# Patient Record
Sex: MALE | Race: WHITE | NOT HISPANIC OR LATINO | Employment: FULL TIME | URBAN - METROPOLITAN AREA
[De-identification: names, ages, dates, MRNs, and addresses within clinical notes are randomized per-mention and may not be internally consistent; named-entity substitution may affect disease eponyms.]

---

## 2017-10-24 ENCOUNTER — AMBULATORY - HEALTHEAST (OUTPATIENT)
Dept: NURSING | Facility: CLINIC | Age: 42
End: 2017-10-24

## 2017-10-24 DIAGNOSIS — Z23 IMMUNIZATION DUE: ICD-10-CM

## 2017-12-05 ENCOUNTER — HOSPITAL ENCOUNTER (OUTPATIENT)
Dept: PHYSICAL MEDICINE AND REHAB | Facility: CLINIC | Age: 42
Discharge: HOME | End: 2017-12-05
Attending: PHYSICIAN ASSISTANT

## 2017-12-05 DIAGNOSIS — M79.18 MYOFASCIAL PAIN: ICD-10-CM

## 2017-12-05 DIAGNOSIS — M54.50 ACUTE LOW BACK PAIN: ICD-10-CM

## 2017-12-12 ENCOUNTER — OFFICE VISIT - HEALTHEAST (OUTPATIENT)
Dept: PHYSICAL THERAPY | Facility: REHABILITATION | Age: 42
End: 2017-12-12

## 2017-12-12 DIAGNOSIS — R29.3 POOR POSTURE: ICD-10-CM

## 2017-12-12 DIAGNOSIS — G89.29 CHRONIC BILATERAL LOW BACK PAIN WITHOUT SCIATICA: ICD-10-CM

## 2017-12-12 DIAGNOSIS — M54.50 CHRONIC BILATERAL LOW BACK PAIN WITHOUT SCIATICA: ICD-10-CM

## 2017-12-12 DIAGNOSIS — M62.81 GENERALIZED MUSCLE WEAKNESS: ICD-10-CM

## 2017-12-21 ENCOUNTER — OFFICE VISIT - HEALTHEAST (OUTPATIENT)
Dept: PHYSICAL THERAPY | Facility: REHABILITATION | Age: 42
End: 2017-12-21

## 2017-12-21 DIAGNOSIS — R29.3 POOR POSTURE: ICD-10-CM

## 2017-12-21 DIAGNOSIS — M54.50 CHRONIC BILATERAL LOW BACK PAIN WITHOUT SCIATICA: ICD-10-CM

## 2017-12-21 DIAGNOSIS — G89.29 CHRONIC BILATERAL LOW BACK PAIN WITHOUT SCIATICA: ICD-10-CM

## 2017-12-21 DIAGNOSIS — M62.81 GENERALIZED MUSCLE WEAKNESS: ICD-10-CM

## 2018-01-04 ENCOUNTER — OFFICE VISIT - HEALTHEAST (OUTPATIENT)
Dept: PHYSICAL THERAPY | Facility: REHABILITATION | Age: 43
End: 2018-01-04

## 2018-01-04 DIAGNOSIS — G89.29 CHRONIC BILATERAL LOW BACK PAIN WITHOUT SCIATICA: ICD-10-CM

## 2018-01-04 DIAGNOSIS — M54.50 CHRONIC BILATERAL LOW BACK PAIN WITHOUT SCIATICA: ICD-10-CM

## 2018-01-04 DIAGNOSIS — R29.3 POOR POSTURE: ICD-10-CM

## 2018-01-04 DIAGNOSIS — M62.81 GENERALIZED MUSCLE WEAKNESS: ICD-10-CM

## 2018-01-08 ENCOUNTER — HOSPITAL ENCOUNTER (OUTPATIENT)
Dept: PHYSICAL MEDICINE AND REHAB | Facility: CLINIC | Age: 43
Discharge: HOME | End: 2018-01-08
Attending: PHYSICIAN ASSISTANT

## 2018-01-08 DIAGNOSIS — M79.18 MYOFASCIAL PAIN: ICD-10-CM

## 2018-01-08 DIAGNOSIS — M54.50 ACUTE LOW BACK PAIN: ICD-10-CM

## 2018-01-18 ENCOUNTER — OFFICE VISIT - HEALTHEAST (OUTPATIENT)
Dept: PHYSICAL THERAPY | Facility: REHABILITATION | Age: 43
End: 2018-01-18

## 2018-01-18 DIAGNOSIS — M62.81 GENERALIZED MUSCLE WEAKNESS: ICD-10-CM

## 2018-01-18 DIAGNOSIS — G89.29 CHRONIC BILATERAL LOW BACK PAIN WITHOUT SCIATICA: ICD-10-CM

## 2018-01-18 DIAGNOSIS — M54.50 CHRONIC BILATERAL LOW BACK PAIN WITHOUT SCIATICA: ICD-10-CM

## 2018-01-18 DIAGNOSIS — R29.3 POOR POSTURE: ICD-10-CM

## 2018-05-08 ENCOUNTER — OFFICE VISIT - HEALTHEAST (OUTPATIENT)
Dept: FAMILY MEDICINE | Facility: CLINIC | Age: 43
End: 2018-05-08

## 2018-05-08 DIAGNOSIS — G89.29 CHRONIC BILATERAL LOW BACK PAIN WITHOUT SCIATICA: ICD-10-CM

## 2018-05-08 DIAGNOSIS — Z00.00 ROUTINE GENERAL MEDICAL EXAMINATION AT A HEALTH CARE FACILITY: ICD-10-CM

## 2018-05-08 DIAGNOSIS — M54.6 PAIN IN THORACIC SPINE: ICD-10-CM

## 2018-05-08 DIAGNOSIS — M54.50 CHRONIC BILATERAL LOW BACK PAIN WITHOUT SCIATICA: ICD-10-CM

## 2018-05-08 ASSESSMENT — MIFFLIN-ST. JEOR: SCORE: 1773.24

## 2018-05-11 ENCOUNTER — AMBULATORY - HEALTHEAST (OUTPATIENT)
Dept: LAB | Facility: CLINIC | Age: 43
End: 2018-05-11

## 2018-05-11 ENCOUNTER — COMMUNICATION - HEALTHEAST (OUTPATIENT)
Dept: FAMILY MEDICINE | Facility: CLINIC | Age: 43
End: 2018-05-11

## 2018-05-11 DIAGNOSIS — Z00.00 ROUTINE GENERAL MEDICAL EXAMINATION AT A HEALTH CARE FACILITY: ICD-10-CM

## 2018-05-11 LAB
CHOLEST SERPL-MCNC: 216 MG/DL
FASTING STATUS PATIENT QL REPORTED: YES
FASTING STATUS PATIENT QL REPORTED: YES
GLUCOSE BLD-MCNC: 90 MG/DL (ref 70–99)
HDLC SERPL-MCNC: 63 MG/DL
LDLC SERPL CALC-MCNC: 138 MG/DL
TRIGL SERPL-MCNC: 75 MG/DL

## 2018-08-02 ENCOUNTER — RECORDS - HEALTHEAST (OUTPATIENT)
Dept: ADMINISTRATIVE | Facility: OTHER | Age: 43
End: 2018-08-02

## 2021-05-31 VITALS — WEIGHT: 192 LBS

## 2021-05-31 VITALS — WEIGHT: 191 LBS

## 2021-06-01 VITALS — WEIGHT: 189 LBS | BODY MASS INDEX: 25.05 KG/M2 | HEIGHT: 73 IN

## 2021-06-14 NOTE — PROGRESS NOTES
"[facility]   [facility] Initial Evaluation    Patient Name: Mauricio Simon  Date of evaluation: 12/12/2017  Referral Diagnosis:   Acute low back pain [M54.5]  - Primary       Myofascial pain [M79.1]       Referring provider: Lamar Tirado PA-C  Visit Diagnosis:     ICD-10-CM    1. Chronic bilateral low back pain without sciatica M54.5     G89.29    2. Generalized muscle weakness M62.81    3. Poor posture R29.3        Assessment:   Mauricio Simon (Nick) is a 42 y.o. male who presents to therapy today with chief complaints of midline lower back pain without radiation. Pt has experienced back pain for >2 years with recent increase in pain approximately 2 weeks ago after bending over.   Current pain is located across midline R > L sided, described as very \"tight\". No LE radiation, no numbness/tingling.  Patient is unable to sleep without waking due to pain, and has difficulty sitting for long periods and bending forward due to pain.   Evaluation reveals: restrictions in lumbar AROM, + hypomobility of facet joint, + mm hypertonicity. S/s consistent with back pain likely due to disc pathology vs facet joint restriction. Patient will benefit from 1:1 skilled physical therapy services to address the above limitations.       Goals:  Pt. will be independent with home exercise program in : 2 weeks  Pt will: squat and lift >30 lbs without increased pain to improve ADL's in 8 weeks  Pt will: sit for >60 min without increased back pain to better tolerate desk job in 8 weeks  Pt will: return to walking at a brisk pace for >30 min without increased pain to progress towards running in 8 weeks  Pt will: sleep through the night without waking due to back pain >3/7 nights/week in 8 weeks    Patient's expectations/goals are realistic.    Barriers to Learning or Achieving Goals:  No Barriers.       Plan / Patient Instructions:        Plan of Care:   Authorization / Certification Start Date: 12/12/17  Authorization / " "Certification Number of Visits: up to 8 visits   Communication with: Referral Source  Patient Related Instruction: Nature of Condition;Self Care instruction;Treatment plan and rationale;Basis of treatment;Body mechanics;Posture;Next steps  Times per Week: 1x/week  Number of Weeks: up to 12 weeks  Number of Visits: up to 8 visits   Therapeutic Exercise: ROM;Stretching;Strengthening  Neuromuscular Reeducation: kinesio tape;posture;balance/proprioception;TNE;core  Manual Therapy: myofascial release;soft tissue mobilization;joint mobilization;muscle energy  Other Plan #1: therapeutic activity     Plan for next visit: progress strengthening      Subjective:        Mauricio (Jennifer Simon is a 43 y/o male who presents today with chief c/o midline lower back pain without radiation. Pt has experienced back pain for >2 years with recent increase in pain approximately 2 weeks ago.   Pt reports he was bending over after getting out of the shower and felt sharp R>L sided pain. His pain lessened slightly, but a week later increased again after sleeping.   [facility] which he is finishing today. His pain has significantly reduced since using the steroids. Current pain is located across midline, described as very \"tight\". No LE radiation, no numbness/tingling. Works as a .       Patient goals (established in collaboration with pt today): return to exercise walking and running, return to yardwork/housework without pain       Current activity level: enjoys walking, hiking, hasn't run in 2-3 months and would like to return this       Social information:   Living Situation:single family home   Occupation: [employer]      Pain Rating:3  Pain rating at best: 0  Pain rating at worst: 10  Pain description: aching and sharp    Patient reports benefit from:  ibuprofen         Objective:      Note: Items left blank indicates the item was not performed or not indicated at the time " of the evaluation.    Patient Outcome Measures :    Modified Oswestry Low Back Pain Disablity Questionnaire  in %: 14   Scores range from 0-100%, where a score of 0% represents minimal pain and maximal function. The minimal clinically important difference is a score reduction of 12%.    Examination  1. Chronic bilateral low back pain without sciatica     2. Generalized muscle weakness     3. Poor posture         Precautions/Restrictions: None  Involved side: Bilateral    Posture Observation:   Standing: stands with decreased Lx lordosis, decreased glut tone noted, increased thoracic kyphosis   Sitting:        Lumbar ROM:    Date:      *Indicate scale AROM AROM AROM   Lumbar Flexion Finger tips past patella B no symptoms      Lumbar Extension Full no pain       Right Left Right Left Right Left   Lumbar Sidebending No pain, full ROM No pain, full ROM        Lumbar Rotation No pain, full ROM No pain, full ROM        Thoracic Flexion      Thoracic Extension      Thoracic Sidebending         Thoracic Rotation           Lower Extremity Strength:     Date:      LE strength/5 Right Left Right Left Right Left   Hip Flexion (L1-3) 5 5       Hip Extension (L5-S1)         Hip Abduction (L4-5)         Hip Adduction (L2-3)         Hip External Rotation         Hip Internal Rotation         Knee Extension (L3-4)         Knee Flexion         Ankle Dorsiflexion (L4-5) 5 5       Great Toe Extension (L5) 5 5       Ankle Plantar flexion (S1) 5 5       Abdominals                       Reflex Testing:  Patellar (L3-4): WNL B  Achilles (S1-2): WNL B    Sensation:      Hip PROM with overpressure:   Flexion/IR/ER WNLB no pain with OP  +SURY with tightness only  SLR limited by hamstrings at approx 60 degrees, -ve NNT      Lumbar Special Tests:    Lumbar Special Tests Right Left SI Tests Right  Left   Quadrant test   SI Compression     Straight leg raise   SI Distraction     Crossover response   Thigh Thrust     Slump   Sacral Thrust        SURY     Trunk extensor endurance test  Resisted Abduction     Prone instability test  Other:         Palpation:   Hypertonic R > L sided paraspinals     Repeated Motion Testing:      Passive Mobility - Joint Integrity:  PA's: hypomobility L5>L4>L3 R > L sided pain       Treatment Today     TREATMENT MINUTES COMMENTS   Evaluation 30 Lumbar evaluation    Self-care/ Home management     Manual therapy     Neuromuscular Re-education     Therapeutic Activity     Therapeutic Exercises 25 Educated on disc pathology including micro-tearing of annulus, inflammatory process, facet joint restriction, and mm resulting hypertonicity.  Discussed benefits of continued walking for exercise. Suggested pt can walk as far as he would like as long as he does not increase Sx.    Exercises initiated:   Exercise #1: TrA  Comment #1: HEP x 10 second hold x 15-20 reps  Exercise #2: Multifidus in prone   Comment #2: HEP x 10 sec hold x 15-20 reps  Exercise #3: Bridges  Comment #3: HEP x 5 seconds x 10-15 reps  Exercise #4: Stretching: supine figure 4 piriformis and seated hamstring with arm overhead  Comment #4: HEP x 30-45 seconds x 2-3 reps     Gait training     Modality__________________                Total 55    Blank areas are intentional and mean the treatment did not include these items.       PT Evaluation Code: (Please list factors)  Patient History/Comorbidities: back pain  Examination: see above  Clinical Presentation: stable  Clinical Decision Making: low    Patient History/  Comorbidities Examination  (body structures and functions, activity limitations, and/or participation restrictions) Clinical Presentation Clinical Decision Making (Complexity)   No documented Comorbidities or personal factors 1-2 Elements Stable and/or uncomplicated Low   1-2 documented comorbidities or personal factor 3 Elements Evolving clinical presentation with changing characteristics Moderate   3-4 documented comorbidities or personal factors 4 or  more Unstable and unpredictable High              Tova Brewster  12/12/2017  3:33 PM

## 2021-06-14 NOTE — PROGRESS NOTES
ASSESSMENT: Mauricio Simon is a 42 y.o. male who is otherwise who presents today for new patient evaluation of a one-week history of midline lower back pain with radiation across the right PSIS.  Patient has not had any imaging of the spine.  I suspect that the patient has an annular tear/bulge with secondary myofascial pain.  He is neurologically intact.  He does not have any radicular symptoms.  No red flags.    NICKO: 30  Who 5: 19    PLAN:  A shared decision making model was used.  The patient's values and choices were respected.  The following represents what was discussed and decided upon by the physician assistant and the patient.      1.  DIAGNOSTIC TESTS: No diagnostic tests were ordered as the patient not have any radicular symptoms/red flags and it would not change our treatment plan at this time.  If the patient's pain were to worsen or not improve, recommend an MRI of the lumbar spine.    2.  PHYSICAL THERAPY:  Discussed the importance of core strengthening, ROM, stretching exercises with the patient and how each of these entities is important in decreasing pain.  Explained to the patient that the purpose of physical therapy is to teach the patient a home exercise program.  These exercises need to be performed every day in order to decrease pain and prevent future occurrences of pain.  I placed an order for the patient to begin physical therapy at the [place] location [facility].  He may benefit from a transition to the medics program.    3.  MEDICATIONS:   -Prescribed a Medrol Dosepak for the patient.  Asked that he stop using ibuprofen while he is taking the Medrol Dosepak.  After he completes the Medrol Dosepak he may resume ibuprofen.  -Also provided a prescription for cyclobenzaprine 5-10 mg at bedtime as needed.    4.  INTERVENTIONS: No interventions were ordered.  The patient could potentially benefit from interventional pain management if he fails to improve with conservative treatment,  "depending on the results of advanced imaging studies.    5.  PATIENT EDUCATION: The patient is in agreement with the above plan.  All questions were answered.    6.  FOLLOW-UP:   I will see the patient back in clinic in about 3 weeks.  If he has any questions or concerns in the meantime, he should not hesitate to contact our clinic.      SUBJECTIVE:  Mauricio Simon  Is a 42 y.o. male who presents today for evaluation of low back pain.  The patient was last seen in our clinic in November 2014 by Dr. Rao.  At that time he was complaining of chronic low back pain as well as intermittent sharp thoracic pain.  Patient participated in physical therapy which did give him some relief.  The patient states that he was doing relatively well with only mild low back pain until 1 week ago.  The patient states that he stepped out of the shower and bent over to drive his legs when he felt a sudden, severe pain in the right side of his lower back.  He states it felt like his right back \"gave out.\"  The patient started using ibuprofen and felt slightly better over the next several days until 3 days ago when he woke up with severe pain again.  He spent almost the entire day on the couch.    The patient complains of midline lower back pain.  The pain then radiates across the right side of the lower back.  The patient describes the spine pain as feeling like a deep, bone pain.  On the right side he states it feels like an aching muscle, or a bruised sensation.  He denies any pain radiating into the buttocks or down the legs.  Patient's pain is aggravated with prolonged sitting, transitioning from seated to standing, twisting, and bending.  He has been having difficulty sleeping because of the pain.  He denies any alleviating factors for the pain.  He tried applying heat which did not help.  Patient denies any numbness, tingling, or weakness on the legs.  He denies any loss of bowel or bladder control.  He denies any recent fevers, " chills, sweats.    As mentioned above, the patient did physical therapy for his lower back in 2000 14/2015.  He has not been doing home exercise program.  He does not go to the chiropractor.  He has never had any spine surgery or spine injections.  The patient's been taking ibuprofen 600 mg twice daily which has been helpful.    Current medications: Ibuprofen as needed    No Known Allergies    Past medical history: None    Past Surgical History:   Procedure Laterality Date     NC ABDOMEN SURGERY PROC UNLISTED      Description: Hernia Repair;  Recorded: 09/30/2014;     [place] REMOVAL OF SPERM DUCT(S)      Description: Surgery Of Male Genitalia Vasectomy;  Recorded: 08/20/2010;  Comments: 8/20/2010     Family history: Aunt and grandmother had colon cancer, grandfather had a stroke    Social history: The patient is .  He has 2 children age 14 and 13.  He works as a .  He denies tobacco, alcohol, or illicit drug use.    ROS: Positive for poor sleep quality, back pain.  Specifically negative for bowel/bladder dysfunction, fevers,chills, appetite changes, unexplained weight loss.   Otherwise 13 systems reviewed are negative.  Please see the patient's intake questionnaire from today for details.      OBJECTIVE:  PHYSICAL EXAMINATION:    CONSTITUTIONAL:  Vital signs as above.  No acute distress.  The patient is well nourished and well groomed.  PSYCHIATRIC:  The patient is awake, alert, oriented to person, place, time and answering questions appropriately with clear speech.    HEENT: Normocephalic, atraumatic.  Sclera clear.  Neck is supple.  SKIN:  Skin over the face, bilateral lower extremities, and posterior torso is clean, dry, intact without rashes.    GAIT:  Gait is non-antalgic.  The patient is able to heel and toe walk without significant difficulty.    STANDING EXAMINATION: Tender to palpation over the spinous processes at approximately L4/L5.  Mild tenderness palpation along the right PSIS.  No  tenderness palpation over the sacroiliac joints.  Lumbar flexion severely restricted due to pain.  Lumbar extension intact.    MUSCLE STRENGTH:  The patient has 5/5 strength for the bilateral hip flexors, knee flexors/extensors, ankle dorsiflexors/plantar flexors, great toe extensors, ankle evertors/invertors.  NEUROLOGICAL:  2/4 patellar, and achilles reflexes bilaterally.  Negative Babinski's bilaterally.  No ankle clonus bilaterally. Sensation to light touch is intact in the bilateral L4, L5, and S1 dermatomes.  VASCULAR:  2/4 dorsalis pedis pulses bilaterally.  Bilateral lower extremities are warm.  There is no pitting edema of the bilateral lower extremities.  ABDOMINAL:  Soft, non-distended, non-tender throughout all quadrants.  No pulsatile mass palpated in the left lower quadrant.  LYMPH NODES:  No palpable or tender inguinal lymph nodes.  MUSCULOSKELETAL: Straight leg raise on the right reproduces low back pain, leg pain.  Straight leg raise on the left mildly reduced his low back pain, but not leg pain.

## 2021-06-14 NOTE — PROGRESS NOTES
"[facility] Daily Progress     Patient Name: Mauricio Simon  Date: 12/21/2017  Visit #: 2  PTA visit #:  0  Referral Diagnosis:   Acute low back pain [M54.5]  - Primary       Myofascial pain [M79.1]       Referring provider: Lamar Tirado PA-C  Visit Diagnosis:     ICD-10-CM    1. Chronic bilateral low back pain without sciatica M54.5     G89.29    2. Generalized muscle weakness M62.81    3. Poor posture R29.3      Mauricio Simon (Nick) is a 42 y.o. male who presents to therapy today with chief complaints of midline lower back pain without radiation. Pt has experienced back pain for >2 years with recent increase in pain approximately 2 weeks ago after bending over.   Current pain is located across midline R > L sided, described as very \"tight\". No LE radiation, no numbness/tingling.  Patient is unable to sleep without waking due to pain, and has difficulty sitting for long periods and bending forward due to pain.   Evaluation reveals: restrictions in lumbar AROM, + hypomobility of facet joint, + mm hypertonicity. S/s consistent with back pain likely due to disc pathology vs facet joint restriction. Patient will benefit from 1:1 skilled physical therapy services to address the above limitations.     Assessment:     HEP/POC compliance is  good .     The patient reports good improvements in pain complaints. His HEP was reviewed today and he shows good understanding and compliance with this. He was able to progress strengthening exercises well today and would benefit from continuing to progress HEP with more difficult core strengthening exercises.     Goal Status:  Pt. will be independent with home exercise program in : 2 weeks  Pt will: squat and lift >30 lbs without increased pain to improve ADL's in 8 weeks  Pt will: sit for >60 min without increased back pain to better tolerate desk job in 8 weeks  Pt will: return to walking at a brisk pace for >30 min without increased pain to progress towards " running in 8 weeks  Pt will: sleep through the night without waking due to back pain >3/7 nights/week in 8 weeks    Plan / Patient Education:     Continue with initial plan of care.     Progress core strengthening (prone supermans, planks/sideplanks, quadruped).     Subjective:     Pain Rating: Low level pain.    Overall feeling pretty good. Thinks that the exercises are helping. His original pain feels really good. His normal low back pain also feels better!     Objective:     Progressed Bridge to SL Bridge and TA sets to up up/down down.  No cueing required for TA sets.  Some difficulty with prone multifidi engagement- addition of leg lift to HEP.  Educated on proper sitting posture- avoiding prolonged positioning in PPT/sacral sitting    Exercises:  Exercise #1: TrA  Comment #1: HEP x 10 second hold x 15-20 reps  Exercise #2: Multifidus in prone   Comment #2: HEP x 10 sec hold x 15-20 reps  Exercise #3: Bridges- Progressed to SL bridge  Comment #3: HEP x 3 seconds x 10-15 reps  Exercise #4: Stretching: supine figure 4 piriformis and seated hamstring with arm overhead  Comment #4: HEP x 30-45 seconds x 2-3 reps  Exercise #5: TA Up Up Down Down  Comment #5: X 10   Exercise #6: Prone Leg Extensions  Comment #6: X 10   Exercise #7: Antirotaiton Bands  Comment #7: X 10 blue band- kneeling      Treatment Today     TREATMENT MINUTES COMMENTS   Evaluation     Self-care/ Home management     Manual therapy     Neuromuscular Re-education     Therapeutic Activity     Therapeutic Exercises 28 Review and progression of HEP, discussion of posture.   Gait training     Modality__________________                Total 28    Blank areas are intentional and mean the treatment did not include these items.       Dez SEO  12/21/2017

## 2021-06-15 NOTE — PROGRESS NOTES
[facility] Discharge Summary  Patient Name: Mauricio Simon  Date: 2/9/2018  Referral Diagnosis: Acute low back pain   Referring provider: Heath Thompson*  Visit Diagnosis:   1. Chronic bilateral low back pain without sciatica     2. Generalized muscle weakness     3. Poor posture         Goals: MET  Pt. 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  The patient met goals and has demonstrated understanding of and independence in the home program for self-care, and progression to next steps.  He will initiate contact if questions or concerns arise.    Therapy will be discontinued at this time.  The patient will need a new referral to resume.    Thank you for your referral.  Tova Brewster  2/9/2018  9:17 AM  [facility] Daily Progress     Patient Name: Mauricio Simon  Date: 1/18/2018  Visit #: 4  PTA visit #:  0  Referral Diagnosis:   Acute low back pain [M54.5]  - Primary       Myofascial pain [M79.1]       Referring provider: Lamar Tirado PA-C  Visit Diagnosis:     ICD-10-CM    1. Chronic bilateral low back pain without sciatica M54.5     G89.29    2. Generalized muscle weakness M62.81    3. Poor posture R29.3      Mauricio Simon (Nick) is a 42 y.o. male who presents to therapy today with chief complaints of midline lower back pain without radiation. Pt has experienced back pain for >2 years with recent increase in pain approximately 2 weeks ago after bending  "over.   Current pain is located across midline R > L sided, described as very \"tight\". No LE radiation, no numbness/tingling.  Patient is unable to sleep without waking due to pain, and has difficulty sitting for long periods and bending forward due to pain.   Evaluation reveals: restrictions in lumbar AROM, + hypomobility of facet joint, + mm hypertonicity. S/s consistent with back pain likely due to disc pathology vs facet joint restriction. Patient will benefit from 1:1 skilled physical therapy services to address the above limitations.     Assessment:     Patient has been seen for 4 PT visits since evaluation. He reports complete resolution of back pain and has met all goals. Lumbar AROM is full and pain free. HEP was advanced with more difficult core and hip strengthening as pt c/o slight lower back \"fatigue\" pain at the end of his work day. He is appropriate to d/c from PT at this time.     Goal Status:  Pt. will be independent with home exercise program in : 2 weeks;Met  Pt will: squat and lift >30 lbs without increased pain to improve ADL's in 8 weeks (met)  Pt will: sit for >60 min without increased back pain to better tolerate desk job in 8 weeks (met)  Pt will: return to walking at a brisk pace for >30 min without increased pain to progress towards running in 8 weeks (met)  Pt will: sleep through the night without waking due to back pain >3/7 nights/week in 8 weeks (met)    Plan / Patient Education:     Hold chart open 30 days before formal dc.    Subjective:     Pain Ratin   \"I think it's been better\". Patient reports minimal pain since last visit; only low level pain with sitting for long periods.     Objective:     Lumbar AROM full and pain free in all planes    Treatment Today     TREATMENT MINUTES COMMENTS   Evaluation     Self-care/ Home management     Manual therapy     Neuromuscular Re-education     Therapeutic Activity     Therapeutic Exercises 29 Reviewed and progressed " HEP:  Exercises:  Exercise #1: Plank and side plank  Comment #1: HEP x 20 seconds 3-5 reps, x 10 seconds x 3-5 reps   Exercise #2: Curl up and oblique curl up   Comment #2: HEP x 3 seconds x 10-15 reps   Exercise #3: Bridges- Progressed to SL bridge  Comment #3: HEP x 5-10 seconds x 10-15 reps in a row  Exercise #4: Stretching: supine figure 4 piriformis and seated hamstring with arm overhead  Comment #4: HEP x 30-45 seconds x 2-3 reps  Exercise #5: TrA with marching  Comment #5: HEP x 10 each leg (20 total)  Exercise #6: Quadruped UE/LE extensions, alternating  Comment #6: HEP x 10-20 reps  Exercise #7: Antirotaiton Bands  Comment #7: X 10 blue band- kneeling  Exercise #8: Quadruped fire hydrants  Comment #8: HEP blue band     Discussed POC and progress. Educated on importance of combination of aerobic & strengthening for continued exercise.     Treadmill walking 3.2 mph x 4:30   Gait training     Modality__________________                Total 29    Blank areas are intentional and mean the treatment did not include these items.       Tova Brewster  1/18/2018

## 2021-06-15 NOTE — PROGRESS NOTES
Assessment:   Mauricio Simon is a 42 y.o. y.o. male who is otherwise healthy who presents today for follow-up regarding an acute flareup of midline low back pain with radiation across the right PSIS.  The patient has had 100% resolution of his acute flareup of pain with physical therapy and medical management (a Medrol Dosepak and muscle relaxants).  The patient has mild chronic lower back pain/tightness which he also feels is improving with physical therapy.  The patient does not have any radicular symptoms.  He is neurologically intact.       Plan:     A shared decision making plan was used.  The patient's values and choices were respected.  The following represents what was discussed and decided upon by the physician assistant and the patient.      1.  DIAGNOSTIC TESTS: No diagnostic tests were ordered.    2.  PHYSICAL THERAPY: This patient is currently in physical therapy.  Encouraged him to continue doing his home exercises.    3.  MEDICATIONS: No changes are made to patient's medications.  He is currently just using ibuprofen as needed for pain.  He completed a Medrol Dosepak which provided significant relief of his pain.  He just ran out of the cyclobenzaprine last night but does not feel that he needs a refill.    4.  INTERVENTIONS: No interventions were ordered.      5.  PATIENT EDUCATION: The patient is in agreement with the above plan.  All questions were answered.    6.  FOLLOW-UP: The patient to follow-up with me as needed.  If he has any questions or concerns, he should not hesitate to call.    Subjective:     Mauricio Simon is a 42 y.o. male who presents today for follow-up regarding an acute flareup of low back pain.  I saw the patient on December 5, 2017.  At that time he reported a one-week history of severe low back pain radiating across the right side of his lower back along the PSIS.  I prescribed a Medrol Dosepak and cyclobenzaprine.  Also refer the patient to physical therapy.  Patient  states that the Medrol Dosepak provided significant relief of his pain.  He reports that his flareup of pain has now resolved.  He feels that he is back to his baseline chronic low back pain/tightness.    The patient states that he has mild residual intermittent low back pain.  This is centralized but spans across low back in a broadband distribution at the belt line.  He is no longer having the severe pain along the right PSIS.  He rates his pain today is a 1 out of 10.  At its best it is a 0-10.  At its worst it is a 3 out of 10.  He denies any pain radiating down the leg.  He denies any numbness, tingling, or weakness down the leg.    The patient has had 3 sessions of physical therapy.  He is doing his home exercises.  He is using ibuprofen as needed for pain.  He took his last cyclobenzaprine last night.  He does not feel he needs a refill.    Past medical history is reviewed and is unchanged in the interim.    Family history is reviewed and is unchanged in the interim.    Review of Systems:  Negative for numbness/tingling, loss of bowel/bladder control, footdrop, weakness, headache, dizziness, nausea/vomiting, blurry vision, balance changes.     Objective:   CONSTITUTIONAL:  Vital signs as above.  No acute distress.  The patient is well nourished and well groomed.    PSYCHIATRIC:  The patient is awake, alert, oriented to person, place and time.  The patient is answering questions appropriately with clear speech.  Normal affect.  HEENT: Normocephalic, atraumatic.  Sclera clear.    SKIN:  Skin over the face, posterior torso, bilateral upper and lower extremities is clean, dry, intact without rashes.  MUSCULOSKELETAL:  Gait is non-antalgic.  No significant tenderness over the bilateral lower lumbar paraspinal muscles.      The patient has 5/5 strength for the bilateral hip flexors, knee flexors/extensors, ankle dorsiflexors/plantar flexors, ankle evertors/invertors.    NEUROLOGICAL:    Sensation to light touch is  intact in the bilateral L4, L5, and S1 dermatomes.

## 2021-06-15 NOTE — PROGRESS NOTES
"[facility] Daily Progress     Patient Name: Mauricio Simon  Date: 1/4/2018  Visit #: 3  PTA visit #:  0  Referral Diagnosis:   Acute low back pain [M54.5]  - Primary       Myofascial pain [M79.1]       Referring provider: Lamar Tirado PA-C  Visit Diagnosis:     ICD-10-CM    1. Chronic bilateral low back pain without sciatica M54.5     G89.29    2. Generalized muscle weakness M62.81    3. Poor posture R29.3      Mauricio Simon (Nick) is a 42 y.o. male who presents to therapy today with chief complaints of midline lower back pain without radiation. Pt has experienced back pain for >2 years with recent increase in pain approximately 2 weeks ago after bending over.   Current pain is located across midline R > L sided, described as very \"tight\". No LE radiation, no numbness/tingling.  Patient is unable to sleep without waking due to pain, and has difficulty sitting for long periods and bending forward due to pain.   Evaluation reveals: restrictions in lumbar AROM, + hypomobility of facet joint, + mm hypertonicity. S/s consistent with back pain likely due to disc pathology vs facet joint restriction. Patient will benefit from 1:1 skilled physical therapy services to address the above limitations.     Assessment:     Patient reports significant improvement in back pain since evaluation. He feels his back has returned to baseline as he continues to have some low level soreness during the day. HEP was progressed with additional strengthening without Sx aggravation. Myofascial release was also initiated today.   Patient is appropriate from continued PT services in order to progress into more difficult core strengthening exercises.    HEP/POC compliance is  good .     Goal Status:  Pt. will be independent with home exercise program in : 2 weeks  Pt will: squat and lift >30 lbs without increased pain to improve ADL's in 8 weeks  Pt will: sit for >60 min without increased back pain to better tolerate desk job " in 8 weeks  Pt will: return to walking at a brisk pace for >30 min without increased pain to progress towards running in 8 weeks  Pt will: sleep through the night without waking due to back pain >3/7 nights/week in 8 weeks    Plan / Patient Education:     Continue with initial plan of care.   Progress core strengthening (prone supermans, planks/sideplanks, quadruped).     Subjective:     Pain Rating: Low level pain.   Pt feels occasional soreness in his lower back- feels his back has returned to baseline previous to injury.     Objective:     Exercises reviewed and progressed:  Exercise #1: TrA - d/c  Comment #1: HEP x 10 second hold x 15-20 reps  Exercise #2: Multifidus in prone - d/c  Comment #2: HEP x 10 sec hold x 15-20 reps  Exercise #3: Bridges- Progressed to SL bridge  Comment #3: HEP x 5-10 seconds x 10-15 reps in a row  Exercise #4: Stretching: supine figure 4 piriformis and seated hamstring with arm overhead  Comment #4: HEP x 30-45 seconds x 2-3 reps  Exercise #5: TrA with marching  Comment #5: HEP x 10 each leg (20 total)  Exercise #6: Prone Leg Extensions - d/c added quadruped UE/LE extensions, alternating  Comment #6: HEP x 10-20 reps  Exercise #7: Antirotaiton Bands  Comment #7: X 10 blue band- kneeling  Exercise #8: Clamshell blue band  Comment #8: HEP to fatigue       Treatment Today     TREATMENT MINUTES COMMENTS   Evaluation     Self-care/ Home management     Manual therapy 10 MFR pinch and roll over Lx paraspinals and L1-L5     Followed with long axis distraction at hip grade III x 60 seconds x 2-3 reps B   Neuromuscular Re-education     Therapeutic Activity     Therapeutic Exercises 28 Review and progression of HEP, discussion of posture.   Gait training     Modality__________________                Total 38    Blank areas are intentional and mean the treatment did not include these items.       Tova Brewster  1/4/2018

## 2021-06-16 PROBLEM — E78.5 HYPERLIPIDEMIA: Status: ACTIVE | Noted: 2018-05-11

## 2021-06-16 PROBLEM — D12.6 ADENOMATOUS COLON POLYP: Status: ACTIVE | Noted: 2018-08-10

## 2021-06-17 NOTE — PROGRESS NOTES
Assessment/ Plan     1. Routine general medical examination at a health care facility    Recommend that he remain physically active    Reviewed his family history of colon cancer  Patient will be referred for a colonoscopy  - Ambulatory referral for Colonoscopy    Recommend future laboratory testing including lipid cascade and glucose level  - Lipid Cascade; Future  - Glucose; Future    A tetanus booster was given    Reviewed skin care and recommend monitoring for changing lesions  He has a right mid back lesion which likely is a seborrheic keratoses  Recommend monitoring and if there are changes in his skin lesions favor follow-up with dermatology    2. Midback Pain  3. Chronic bilateral low back pain without sciatica    Recommend follow-up with the spine clinic  He will continue conservative measures including low back stretches and exercises    Patient agrees to plan      Subjective:       Mauricio Simon is a 43 y.o. male who presents to the clinic for a complete physical examination.  He has not been seen in the clinic since 2014.  Recent history is notable for the fact that he has had recurrent mid back and low back pain and has followed up with the spine care clinic for conservative management.  His medical history is otherwise unremarkable for chronic health conditions.  He has had a previous vasectomy as well as previous hernia repair surgery.  He reports he generally has been doing well.  He denies current medications or known drug allergies.      Family history is notable for a mother and father who are healthy.  A paternal grandmother had breast cancer, maternal aunt and maternal great-grandmother had colon cancer, and a paternal grandmother had ovarian cancer.  He would like to consider pursuing a colonoscopy.    Social history is notable fact that he is  and has 2 children.  He continues to work as a .  He drinks a limited amount of alcohol.  He is a non-smoker.    He denies any  "concerns regarding his mood.  He does have a lesion in the right mid back area he would like to have evaluated.  This is scaly and sometimes itchy.  Review of systems otherwise negative for headache, visual changes, chest pain, shortness of breath, or palpitations.    The following portions of the patient's history were reviewed and updated as appropriate: allergies, current medications, past family history, past medical history, past social history, past surgical history and problem list.    Review of Systems   A 12 point comprehensive review of systems was negative except as noted.      No current outpatient prescriptions on file.     No current facility-administered medications for this visit.        Objective:      /78  Pulse 64  Temp 98.4  F (36.9  C) (Oral)   Resp 16  Ht 6' 0.5\" (1.842 m)  Wt 189 lb (85.7 kg)  BMI 25.28 kg/m2      General appearance: alert, appears stated age and cooperative  Head: Normocephalic, without obvious abnormality, atraumatic  Eyes: conjunctivae/corneas clear. PERRL, EOM's intact.   Ears: normal TM's and external ear canals both ears  Nose: Nares normal. Septum midline. Mucosa normal. No drainage or sinus tenderness.  Throat: lips, mucosa, and tongue normal; teeth and gums normal  Neck: no adenopathy, supple, symmetrical, trachea midline   Back: symmetric, no curvature. ROM normal. No CVA tenderness.  Lungs: clear to auscultation bilaterally  Heart: regular rate and rhythm, S1, S2 normal, no murmur, click, rub or gallop  Abdomen: soft, non-tender; bowel sounds normal; no masses,  no organomegaly  Genitourinary: Penis is circumcised, no scrotal masses, no inguinal hernia  Rectal: Normal sphincter tone, prostate smooth and symmetric  Extremities: extremities normal, atraumatic, no cyanosis or edema  Skin: There is a scaly raised lesion in the right mid back area consistent with a seborrheic keratosis  There are several other maculopapular brownish lesions noted on his " trunk  Lymph nodes: Cervical nodes normal.  Neurologic: Alert and oriented X 3. Normal coordination and gait         No results found for this or any previous visit (from the past 168 hour(s)).       This note has been dictated using voice recognition software. Any grammatical or context distortions are unintentional and inherent to the software

## 2021-08-22 ENCOUNTER — HEALTH MAINTENANCE LETTER (OUTPATIENT)
Age: 46
End: 2021-08-22

## 2021-10-09 ENCOUNTER — IMMUNIZATION (OUTPATIENT)
Dept: FAMILY MEDICINE | Facility: CLINIC | Age: 46
End: 2021-10-09
Payer: COMMERCIAL

## 2021-10-09 PROCEDURE — 90471 IMMUNIZATION ADMIN: CPT

## 2021-10-09 PROCEDURE — 90686 IIV4 VACC NO PRSV 0.5 ML IM: CPT

## 2022-10-02 ENCOUNTER — HEALTH MAINTENANCE LETTER (OUTPATIENT)
Age: 47
End: 2022-10-02

## 2023-02-14 ENCOUNTER — OFFICE VISIT (OUTPATIENT)
Dept: FAMILY MEDICINE | Facility: CLINIC | Age: 48
End: 2023-02-14
Payer: COMMERCIAL

## 2023-02-14 VITALS
TEMPERATURE: 98.3 F | HEART RATE: 68 BPM | SYSTOLIC BLOOD PRESSURE: 141 MMHG | DIASTOLIC BLOOD PRESSURE: 86 MMHG | RESPIRATION RATE: 16 BRPM | OXYGEN SATURATION: 96 % | BODY MASS INDEX: 27.47 KG/M2 | HEIGHT: 72 IN | WEIGHT: 202.8 LBS

## 2023-02-14 DIAGNOSIS — G89.29 CHRONIC RIGHT-SIDED LOW BACK PAIN WITH RIGHT-SIDED SCIATICA: Primary | ICD-10-CM

## 2023-02-14 DIAGNOSIS — M54.41 CHRONIC RIGHT-SIDED LOW BACK PAIN WITH RIGHT-SIDED SCIATICA: Primary | ICD-10-CM

## 2023-02-14 PROCEDURE — 99203 OFFICE O/P NEW LOW 30 MIN: CPT | Performed by: FAMILY MEDICINE

## 2023-02-14 ASSESSMENT — ENCOUNTER SYMPTOMS: BACK PAIN: 1

## 2023-02-14 NOTE — PATIENT INSTRUCTIONS
Nick,    You may set up the x-rays of the lumbar spine in the clinic  I recommend an MRI of the lumbar spine as the next step    We can then review the results and consider next steps    Heath Thompson MD

## 2023-02-14 NOTE — PROGRESS NOTES
Assessment & Plan     Chronic right-sided low back pain with right-sided sciatica    X-rays of the lumbar spine are negative for obvious fracture or significant degenerative changes  Given the persistence of this pain despite conservative treatment and clinical presentation will refer for an MRI of the lumbar spine  Depending on results consider referral to the spine care clinic    - XR Lumbar Spine 2/3 Views  - MR Lumbar Spine w/o Contrast        Review of external notes as documented elsewhere in note               No follow-ups on file.    Heath Thompson MD  [facility]    Jesica Romero is a 47 year old, presenting for the following health issues:  Back Pain (Back pain and nerve pain into the thighs, stared having tingling and numbness in thighs in November )    This is a pleasant 47-year-old male who presents to the clinic with ongoing low back pain.  He was last seen in the clinic in May 2018.    He reports that he can have low back pain that can ebb and flow.  More recently he has experienced pain radiating to both legs.  He has had numbness in his thighs, right greater than left.  He can have a sensation of weakness.  It should be noted that in 2018 he did note recurrent mid and low back pain.  In the past he followed up with the spine care clinic for conservative management.  He never improved and his symptoms have now worsened.  He can have some symptoms rating down into his legs as noted.  He denies bowel or bladder dysfunction.  He cannot think of a specific injury.      Back Pain     History of Present Illness       Back Pain:  He presents for follow up of back pain. Patient's back pain is a chronic problem.  Location of back pain:  Right lower back  Description of back pain: dull ache  Back pain spreads: right thigh and left thigh    Since patient first noticed back pain, pain is: always present, but gets better and worse  Does back pain interfere with his job:   No      He eats 2-3 servings of fruits and vegetables daily.He consumes 0 sweetened beverage(s) daily.              Review of Systems   Musculoskeletal: Positive for back pain.            Objective    BP (!) 141/86 (BP Location: Left arm, Patient Position: Sitting, Cuff Size: Adult Regular)   Pulse 68   Temp 98.3  F (36.8  C) (Oral)   Resp 16   Ht 1.829 m (6')   Wt 92 kg (202 lb 12.8 oz)   SpO2 96%   BMI 27.50 kg/m    Body mass index is 27.5 kg/m .  Physical Exam   GENERAL: healthy, alert and no distress  EYES: Eyes grossly normal to inspection, PERRL and conjunctivae and sclerae normal  RESP: lungs clear to auscultation - no rales, rhonchi or wheezes  CV: regular rate and rhythm, normal S1 S2, no S3 or S4, no murmur, click or rub, no peripheral edema and peripheral pulses strong  MS: There is no tenderness over the lumbar spine  There are some paraspinous muscle tenderness bilaterally, lateral to the lumbar spine  Straight leg raise is equivocal bilaterally  Forward flexion at the hips is mildly reduced  SKIN: no suspicious lesions or rashes  PSYCH: mentation appears normal, affect normal/bright

## 2023-02-15 ENCOUNTER — ANCILLARY PROCEDURE (OUTPATIENT)
Dept: GENERAL RADIOLOGY | Facility: CLINIC | Age: 48
End: 2023-02-15
Attending: FAMILY MEDICINE
Payer: COMMERCIAL

## 2023-02-15 DIAGNOSIS — M54.41 CHRONIC RIGHT-SIDED LOW BACK PAIN WITH RIGHT-SIDED SCIATICA: ICD-10-CM

## 2023-02-15 DIAGNOSIS — G89.29 CHRONIC RIGHT-SIDED LOW BACK PAIN WITH RIGHT-SIDED SCIATICA: ICD-10-CM

## 2023-02-15 PROCEDURE — 72100 X-RAY EXAM L-S SPINE 2/3 VWS: CPT | Mod: TC | Performed by: RADIOLOGY

## 2023-02-24 ENCOUNTER — HOSPITAL ENCOUNTER (OUTPATIENT)
Dept: MRI IMAGING | Facility: HOSPITAL | Age: 48
Discharge: HOME | End: 2023-02-24
Attending: FAMILY MEDICINE | Admitting: FAMILY MEDICINE
Payer: COMMERCIAL

## 2023-02-24 DIAGNOSIS — G89.29 CHRONIC RIGHT-SIDED LOW BACK PAIN WITH RIGHT-SIDED SCIATICA: ICD-10-CM

## 2023-02-24 DIAGNOSIS — M54.41 CHRONIC RIGHT-SIDED LOW BACK PAIN WITH RIGHT-SIDED SCIATICA: ICD-10-CM

## 2023-02-24 PROCEDURE — 72148 MRI LUMBAR SPINE W/O DYE: CPT

## 2023-08-27 ASSESSMENT — ENCOUNTER SYMPTOMS
FEVER: 0
HEARTBURN: 0
CHILLS: 0
NERVOUS/ANXIOUS: 0
EYE PAIN: 0
DIARRHEA: 0
JOINT SWELLING: 0
WEAKNESS: 0
ARTHRALGIAS: 0
HEADACHES: 0
ABDOMINAL PAIN: 0
DYSURIA: 0
PALPITATIONS: 0
PARESTHESIAS: 0
SORE THROAT: 0
NAUSEA: 0
HEMATOCHEZIA: 0
CONSTIPATION: 0
COUGH: 0
FREQUENCY: 0
MYALGIAS: 0
HEMATURIA: 0
DIZZINESS: 0
SHORTNESS OF BREATH: 0

## 2023-08-30 ENCOUNTER — OFFICE VISIT (OUTPATIENT)
Dept: FAMILY MEDICINE | Facility: CLINIC | Age: 48
End: 2023-08-30
Payer: COMMERCIAL

## 2023-08-30 VITALS
RESPIRATION RATE: 16 BRPM | SYSTOLIC BLOOD PRESSURE: 118 MMHG | BODY MASS INDEX: 26.09 KG/M2 | HEIGHT: 72 IN | OXYGEN SATURATION: 95 % | WEIGHT: 192.6 LBS | TEMPERATURE: 98.6 F | DIASTOLIC BLOOD PRESSURE: 75 MMHG | HEART RATE: 68 BPM

## 2023-08-30 DIAGNOSIS — E78.00 HYPERCHOLESTEROLEMIA: ICD-10-CM

## 2023-08-30 DIAGNOSIS — Z00.00 ROUTINE GENERAL MEDICAL EXAMINATION AT A HEALTH CARE FACILITY: Primary | ICD-10-CM

## 2023-08-30 DIAGNOSIS — D12.6 ADENOMATOUS POLYP OF COLON, UNSPECIFIED PART OF COLON: ICD-10-CM

## 2023-08-30 PROCEDURE — 99396 PREV VISIT EST AGE 40-64: CPT | Performed by: FAMILY MEDICINE

## 2023-08-30 ASSESSMENT — ENCOUNTER SYMPTOMS
JOINT SWELLING: 0
HEADACHES: 0
DIZZINESS: 0
CHILLS: 0
PARESTHESIAS: 0
DYSURIA: 0
FREQUENCY: 0
SHORTNESS OF BREATH: 0
PALPITATIONS: 0
NERVOUS/ANXIOUS: 0
ABDOMINAL PAIN: 0
FEVER: 0
COUGH: 0
MYALGIAS: 0
EYE PAIN: 0
CONSTIPATION: 0
DIARRHEA: 0
NAUSEA: 0
HEMATURIA: 0
HEARTBURN: 0
SORE THROAT: 0
ARTHRALGIAS: 0
WEAKNESS: 0
HEMATOCHEZIA: 0

## 2023-08-30 NOTE — PROGRESS NOTES
SUBJECTIVE:   CC: Nick is an 48 year old who presents for preventative health visit.     Nick is a pleasant 48-year-old male who presents to clinic for a preventive health examination.    Medical history is notable for hyperlipidemia as well as adenomatous colon polyp on previous colonoscopy.    His last cholesterol revealed a total of 216 with LDL of 138.    His blood pressure was elevated at the last visit but he has been able to lose 10 pounds his blood pressure has been quite good.    He did have an evaluation previously for low back pain some radiation to his thighs.  As noted at the time, he reported that he can have low back pain that can ebb and flow. More recently he has experienced pain radiating to both legs. He has had numbness in his thighs, right greater than left. He can have a sensation of weakness. It should be noted that in 2018 he did note recurrent mid and low back pain. In the past he followed up with the spine care clinic for conservative management. He never improved and his symptoms have now worsened. He can have some symptoms radiating down into his legs as noted.  Following the last visit he was referred for an MRI of the lumbar spine which was generally unremarkable.    He remains physically active.    His most recent colonoscopy was in 2018 and this revealed 2 adenomatous colon polyps, one in the ascending colon and the other in the descending colon.        8/30/2023     2:08 PM   Additional Questions   Roomed by Blessing ROBIN       Healthy Habits:     Getting at least 3 servings of Calcium per day:  Yes    Bi-annual eye exam:  Yes    Dental care twice a year:  Yes    Sleep apnea or symptoms of sleep apnea:  None    Diet:  Regular (no restrictions)    Frequency of exercise:  2-3 days/week    Duration of exercise:  30-45 minutes    Taking medications regularly:  Yes    Medication side effects:  Not applicable    Additional concerns today:  No              Have you ever done Advance Care  Planning? (For example, a Health Directive, POLST, or a discussion with a medical provider or your loved ones about your wishes): Yes, patient states has an Advance Care Planning document and will bring a copy to the clinic.    Social History     Tobacco Use    Smoking status: Never    Smokeless tobacco: Never   Substance Use Topics    Alcohol use: Not on file             8/27/2023     8:40 PM   Alcohol Use   Prescreen: >3 drinks/day or >7 drinks/week? No          No data to display                Last PSA: No results found for: PSA    Reviewed orders with patient. Reviewed health maintenance and updated orders accordingly - Yes  Patient Active Problem List   Diagnosis    Lower Back Pain    Midback Pain    Hyperlipidemia    Adenomatous colon polyp     Past Surgical History:   Procedure Laterality Date    C UNLISTED PROCEDURE, ABDOMEN/PERITONEUM/OMENTUM      Description: Hernia Repair;  Recorded: 09/30/2014;    REMOVAL OF SPERM DUCT(S)      Description: Surgery Of Male Genitalia Vasectomy;  Recorded: 08/20/2010;  Comments: 8/20/2010       Social History     Tobacco Use    Smoking status: Never    Smokeless tobacco: Never   Substance Use Topics    Alcohol use: Not on file     Family History   Problem Relation Age of Onset    Colon Polyps Mother     No Known Problems Father     No Known Problems Sister     No Known Problems Brother     Colon Polyps Maternal Aunt     Breast Cancer Paternal Grandmother     Ovarian Cancer Paternal Grandmother     Colon Polyps Other          No current outpatient medications on file.     No Known Allergies    Reviewed and updated as needed this visit by clinical staff   Tobacco  Allergies  Meds              Reviewed and updated as needed this visit by Provider                 History reviewed. No pertinent past medical history.   Past Surgical History:   Procedure Laterality Date    C UNLISTED PROCEDURE, ABDOMEN/PERITONEUM/OMENTUM      Description: Hernia Repair;  Recorded: 09/30/2014;  "   REMOVAL OF SPERM DUCT(S)      Description: Surgery Of Male Genitalia Vasectomy;  Recorded: 08/20/2010;  Comments: 8/20/2010       Review of Systems   Constitutional:  Negative for chills and fever.   HENT:  Negative for congestion, ear pain, hearing loss and sore throat.    Eyes:  Negative for pain and visual disturbance.   Respiratory:  Negative for cough and shortness of breath.    Cardiovascular:  Negative for chest pain, palpitations and peripheral edema.   Gastrointestinal:  Negative for abdominal pain, constipation, diarrhea, heartburn, hematochezia and nausea.   Genitourinary:  Negative for dysuria, frequency, genital sores, hematuria, impotence, penile discharge and urgency.   Musculoskeletal:  Negative for arthralgias, joint swelling and myalgias.   Skin:  Negative for rash.   Neurological:  Negative for dizziness, weakness, headaches and paresthesias.   Psychiatric/Behavioral:  Negative for mood changes. The patient is not nervous/anxious.          OBJECTIVE:   Resp 16   Ht 1.835 m (6' 0.25\")   Wt 87.4 kg (192 lb 9.6 oz)   BMI 25.94 kg/m      Physical Exam  GENERAL: healthy, alert and no distress  EYES: Eyes grossly normal to inspection, PERRL and conjunctivae and sclerae normal  HENT: ear canals and TM's normal, nose and mouth without ulcers or lesions  NECK: no adenopathy, no asymmetry, masses, or scars and thyroid normal to palpation  RESP: lungs clear to auscultation - no rales, rhonchi or wheezes  CV: regular rate and rhythm, normal S1 S2, no S3 or S4, no murmur, click or rub, no peripheral edema and peripheral pulses strong  ABDOMEN: soft, nontender  MS: no gross musculoskeletal defects noted, no edema  SKIN: no suspicious lesions or rashes  NEURO: Normal strength and tone, mentation intact and speech normal  PSYCH: mentation appears normal, affect normal/bright    Diagnostic Test Results:  Labs reviewed in Epic    ASSESSMENT/PLAN:   Mauricio was seen today for physical.    Diagnoses and all " "orders for this visit:    Routine general medical examination at a health care facility    Commended been for remaining physically active  Check a PSA    -     PSA, screen; Future    Hypercholesterolemia    Check lipid cascade  Calculate the 10-year cardiovascular risk    -     Lipid panel reflex to direct LDL Non-fasting; Future  -     CBC with Platelets & Differential; Future  -     Comprehensive metabolic panel; Future    Adenomatous polyp of colon, unspecified part of colon    His most recent colonoscopy was in 2018.  Given 2 adenomatous polyps he is due and has that planned    Patient has been advised of split billing requirements and indicates understanding: Yes      COUNSELING:   Reviewed preventive health counseling, as reflected in patient instructions       Regular exercise       Healthy diet/nutrition       Alcohol Use        Colorectal cancer screening       Prostate cancer screening      BMI:   Estimated body mass index is 25.94 kg/m  as calculated from the following:    Height as of this encounter: 1.835 m (6' 0.25\").    Weight as of this encounter: 87.4 kg (192 lb 9.6 oz).   Weight management plan: Discussed healthy diet and exercise guidelines      He reports that he has never smoked. He has never used smokeless tobacco.            Heath Thompson MD  [facility]  "

## 2023-08-30 NOTE — PATIENT INSTRUCTIONS
Nick,    Great job with staying physically active  You may set up a fasting laboratory appointment at your convenience  Have your colonoscopy as planned  You can consider the Shingrix/shingles vaccine    Heath Thompson MD      Preventive Health Recommendations  Male Ages 50 - 64    Yearly exam:             See your health care provider every year in order to  o   Review health changes.   o   Discuss preventive care.    o   Review your medicines if your doctor has prescribed any.   Have a cholesterol test every 5 years, or more frequently if you are at risk for high cholesterol/heart disease.   Have a diabetes test (fasting glucose) every three years. If you are at risk for diabetes, you should have this test more often.   Have a colonoscopy at age 50, or have a yearly FIT test (stool test). These exams will check for colon cancer.    Talk with your health care provider about whether or not a prostate cancer screening test (PSA) is right for you.  You should be tested each year for STDs (sexually transmitted diseases), if you re at risk.     Shots: Get a flu shot each year. Get a tetanus shot every 10 years.     Nutrition:  Eat at least 5 servings of fruits and vegetables daily.   Eat whole-grain bread, whole-wheat pasta and brown rice instead of white grains and rice.   Get adequate Calcium and Vitamin D.     Lifestyle  Exercise for at least 150 minutes a week (30 minutes a day, 5 days a week). This will help you control your weight and prevent disease.   Limit alcohol to one drink per day.   No smoking.   Wear sunscreen to prevent skin cancer.   See your dentist every six months for an exam and cleaning.   See your eye doctor every 1 to 2 years.

## 2023-09-01 ENCOUNTER — LAB (OUTPATIENT)
Dept: LAB | Facility: CLINIC | Age: 48
End: 2023-09-01
Payer: COMMERCIAL

## 2023-09-01 DIAGNOSIS — E78.00 HYPERCHOLESTEROLEMIA: ICD-10-CM

## 2023-09-01 DIAGNOSIS — Z00.00 ROUTINE GENERAL MEDICAL EXAMINATION AT A HEALTH CARE FACILITY: ICD-10-CM

## 2023-09-01 LAB
ALBUMIN SERPL BCG-MCNC: 4.6 G/DL (ref 3.5–5.2)
ALP SERPL-CCNC: 59 U/L (ref 40–129)
ALT SERPL W P-5'-P-CCNC: 11 U/L (ref 0–70)
ANION GAP SERPL CALCULATED.3IONS-SCNC: 9 MMOL/L (ref 7–15)
AST SERPL W P-5'-P-CCNC: 18 U/L (ref 0–45)
BASOPHILS # BLD AUTO: 0.1 10E3/UL (ref 0–0.2)
BASOPHILS NFR BLD AUTO: 1 %
BILIRUB SERPL-MCNC: 0.6 MG/DL
BUN SERPL-MCNC: 10.4 MG/DL (ref 6–20)
CALCIUM SERPL-MCNC: 9.1 MG/DL (ref 8.6–10)
CHLORIDE SERPL-SCNC: 104 MMOL/L (ref 98–107)
CHOLEST SERPL-MCNC: 244 MG/DL
CREAT SERPL-MCNC: 0.98 MG/DL (ref 0.67–1.17)
DEPRECATED HCO3 PLAS-SCNC: 28 MMOL/L (ref 22–29)
EOSINOPHIL # BLD AUTO: 0.1 10E3/UL (ref 0–0.7)
EOSINOPHIL NFR BLD AUTO: 2 %
ERYTHROCYTE [DISTWIDTH] IN BLOOD BY AUTOMATED COUNT: 12.1 % (ref 10–15)
GFR SERPL CREATININE-BSD FRML MDRD: >90 ML/MIN/1.73M2
GLUCOSE SERPL-MCNC: 88 MG/DL (ref 70–99)
HCT VFR BLD AUTO: 49.1 % (ref 40–53)
HDLC SERPL-MCNC: 82 MG/DL
HGB BLD-MCNC: 16.2 G/DL (ref 13.3–17.7)
IMM GRANULOCYTES # BLD: 0 10E3/UL
IMM GRANULOCYTES NFR BLD: 0 %
LDLC SERPL CALC-MCNC: 144 MG/DL
LYMPHOCYTES # BLD AUTO: 1.8 10E3/UL (ref 0.8–5.3)
LYMPHOCYTES NFR BLD AUTO: 22 %
MCH RBC QN AUTO: 29.9 PG (ref 26.5–33)
MCHC RBC AUTO-ENTMCNC: 33 G/DL (ref 31.5–36.5)
MCV RBC AUTO: 91 FL (ref 78–100)
MONOCYTES # BLD AUTO: 0.6 10E3/UL (ref 0–1.3)
MONOCYTES NFR BLD AUTO: 7 %
NEUTROPHILS # BLD AUTO: 5.6 10E3/UL (ref 1.6–8.3)
NEUTROPHILS NFR BLD AUTO: 69 %
NONHDLC SERPL-MCNC: 162 MG/DL
PLATELET # BLD AUTO: 246 10E3/UL (ref 150–450)
POTASSIUM SERPL-SCNC: 4.5 MMOL/L (ref 3.4–5.3)
PROT SERPL-MCNC: 7.4 G/DL (ref 6.4–8.3)
PSA SERPL DL<=0.01 NG/ML-MCNC: 1.58 NG/ML (ref 0–3.5)
RBC # BLD AUTO: 5.41 10E6/UL (ref 4.4–5.9)
SODIUM SERPL-SCNC: 141 MMOL/L (ref 136–145)
TRIGL SERPL-MCNC: 89 MG/DL
WBC # BLD AUTO: 8.1 10E3/UL (ref 4–11)

## 2023-09-01 PROCEDURE — 80061 LIPID PANEL: CPT

## 2023-09-01 PROCEDURE — 36415 COLL VENOUS BLD VENIPUNCTURE: CPT

## 2023-09-01 PROCEDURE — G0103 PSA SCREENING: HCPCS

## 2023-09-01 PROCEDURE — 80053 COMPREHEN METABOLIC PANEL: CPT

## 2023-09-01 PROCEDURE — 85025 COMPLETE CBC W/AUTO DIFF WBC: CPT

## 2023-09-08 ENCOUNTER — TRANSFERRED RECORDS (OUTPATIENT)
Dept: HEALTH INFORMATION MANAGEMENT | Facility: CLINIC | Age: 48
End: 2023-09-08
Payer: COMMERCIAL

## 2024-02-21 ENCOUNTER — OFFICE VISIT (OUTPATIENT)
Dept: PHYSICAL MEDICINE AND REHAB | Facility: CLINIC | Age: 49
End: 2024-02-21
Attending: FAMILY MEDICINE
Payer: COMMERCIAL

## 2024-02-21 VITALS
OXYGEN SATURATION: 98 % | WEIGHT: 195 LBS | SYSTOLIC BLOOD PRESSURE: 130 MMHG | DIASTOLIC BLOOD PRESSURE: 82 MMHG | HEIGHT: 72 IN | BODY MASS INDEX: 26.41 KG/M2 | HEART RATE: 78 BPM

## 2024-02-21 DIAGNOSIS — M51.26 DEGENERATION OF LUMBAR INTERVERTEBRAL DISC WITH ACUTE HERNIATION: Primary | ICD-10-CM

## 2024-02-21 DIAGNOSIS — M54.50 ACUTE LOW BACK PAIN, UNSPECIFIED BACK PAIN LATERALITY, UNSPECIFIED WHETHER SCIATICA PRESENT: ICD-10-CM

## 2024-02-21 DIAGNOSIS — M51.369 DEGENERATION OF LUMBAR INTERVERTEBRAL DISC WITH ACUTE HERNIATION: Primary | ICD-10-CM

## 2024-02-21 DIAGNOSIS — M54.41 CHRONIC RIGHT-SIDED LOW BACK PAIN WITH RIGHT-SIDED SCIATICA: ICD-10-CM

## 2024-02-21 DIAGNOSIS — G89.29 CHRONIC RIGHT-SIDED LOW BACK PAIN WITH RIGHT-SIDED SCIATICA: ICD-10-CM

## 2024-02-21 PROCEDURE — 99204 OFFICE O/P NEW MOD 45 MIN: CPT | Performed by: STUDENT IN AN ORGANIZED HEALTH CARE EDUCATION/TRAINING PROGRAM

## 2024-02-21 RX ORDER — METHYLPREDNISOLONE 4 MG
TABLET, DOSE PACK ORAL
Qty: 21 TABLET | Refills: 0 | Status: SHIPPED | OUTPATIENT
Start: 2024-02-21

## 2024-02-21 ASSESSMENT — PAIN SCALES - GENERAL: PAINLEVEL: MILD PAIN (2)

## 2024-02-21 NOTE — PROGRESS NOTES
ASSESSMENT:  Mauricio Simon is a 48 year old male presents for consultation at the request of Heath Thompson who presents today for new patient evaluation of :         Diagnoses and all orders for this visit:  Degeneration of lumbar intervertebral disc with acute herniation  -     methylPREDNISolone (MEDROL DOSEPAK) 4 MG tablet therapy pack; Follow Package Directions  -     Physical Therapy  Referral; Future  Acute low back pain, unspecified back pain laterality, unspecified whether sciatica present  -     Spine  Referral  -     methylPREDNISolone (MEDROL DOSEPAK) 4 MG tablet therapy pack; Follow Package Directions  -     Physical Therapy  Referral; Future  Chronic right-sided low back pain with right-sided sciatica  -     Spine  Referral       Patient is neurologically intact on exam. No myelopathic or red flag symptoms.    Patient presents with acute low back pain in the setting of recent bending and lifting.  He reports this is a recurrence of similar back pain he has had episodically for the last 10 years.  Pain worsens with bending or twisting, improves with rest and anti-inflammatories.  We reviewed his MRI which does show disc bulge at the L3-4 level and no significant spinal or foraminal stenoses.  We discussed that this may well be a recurrent disc herniation of the L3-4, which has been happening episodically for the last several years potentially with spontaneous regression.  For this episode we will start with anti-inflammatories and directional physical therapy.  Patient was advised that given the recurrence of these herniations, it will be very important moving forward for him to have a daily lumbar exercise routine to reduce his risk of recurrence.  Patient is in agreement with this plan, and all questions were addressed.    PLAN:  Reviewed spine anatomy and disease process. Discussed diagnosis and treatment options with the patient today. A shared decision  making model was used. The patient's values and choices were respected. The following represents what was discussed and decided upon by the provider and the patient.    1. DIAGNOSTIC TESTS  No new imaging orders at this time.    2. PHYSICAL THERAPY  Physical therapy was ordered through [facility] or the external clinic of patient's choice.  Discussed the importance of core strengthening, ROM, stretching exercises with the patient and how each of these entities is important in decreasing pain.  Explained to the patient that the purpose of physical therapy is to teach the patient a home exercise program.  These exercises need to be performed every day in order to decrease pain and prevent future occurrences of pain.  Likened it to brushing one's teeth.      3. MEDICATIONS:  Discussed multiple medication options today with patient. Discussed risks, side effects, and proper use of medications. Patient verbalized understanding.  Prescribed Medrol Dosepak today, reviewed dosing and side effects  If any side effects with Medrol, will plan to switch to Mobic or Celebrex  Patient advised to call our clinic's nurse navigation line (number provided) if they experience any side effects or intolerances with prescribed medication.    4. INTERVENTIONS:  Interventional treatments are not indicated for patient's presentation.    5. OTHER REFERRALS:  No other referrals at this time.    6. FOLLOW-UP  After completion of physical therapy.  Patient advised to contact our office for earlier appointment if symptoms worsening or not improving, or any side effects are noticed.    Advised patient to call the Spine Center if symptoms worsen or you have problems controlling bladder and bowel function.   ______________________________________________________________________    SUBJECTIVE:   Mauricio Simon  is a 48 year old male who presents today for new patient evaluation.    Patient reports that he has been having acute episodes of back pain  which are self-limited in nature for the last 10 years.  Pain usually comes on when he is twisting or bending to lift something, and in the acute.  He will have significant pain and stiffness in his back, and over time this will steadily improve and eventually resolves.  The current episode began about 3 days ago, when he was bending to lift something off the ground.  Since then he has been using Tylenol and ibuprofen alternating.  Pain worsens with twisting or bending or lifting.  Improves with rest and medication.  Denies any radicular symptoms, no numbness or weakness of legs, no bladder or bowel incontinence.  No prior back surgeries    -Treatment to Date: Did PT several years ago but has not been keeping up with home exercises, recently trying anti-inflammatories and Tylenol      Oswestry (NICKO) Questionnaire        2/20/2024     4:54 PM   OSWESTRY DISABILITY INDEX   Count 9   Sum 9   Oswestry Score (%) 20 %       Neck Disability Index:      2/20/2024     4:55 PM   Neck Disability Index (  [employer]ir[employer]. 1991. All rights reserved.; used with permission)   SECTION 1 - PAIN INTENSITY 0   SECTION 2 - PERSONAL CARE 0   SECTION 3 - LIFTING 0   SECTION 4 - READING 0   SECTION 5 - HEADACHES 0   SECTION 6 - CONCENTRATION 0   SECTION 7 - WORK 0   SECTION 8 - DRIVING 0   SECTION 9 - SLEEPING 0   SECTION 10 - RECREATION 0   Count 10   Sum 0   Raw Score: /50 0   Neck Disability Index Score: (%) 0 %              -Medications:    Current Outpatient Medications   Medication    methylPREDNISolone (MEDROL DOSEPAK) 4 MG tablet therapy pack     No current facility-administered medications for this visit.       No Known Allergies    No past medical history on file.     Patient Active Problem List   Diagnosis    Lower Back Pain    Midback Pain    Hyperlipidemia    Adenomatous colon polyp       Past Surgical History:   Procedure Laterality Date    C UNLISTED PROCEDURE, ABDOMEN/PERITONEUM/OMENTUM      Description: Hernia  Repair;  Recorded: 09/30/2014;    REMOVAL OF SPERM DUCT(S)      Description: Surgery Of Male Genitalia Vasectomy;  Recorded: 08/20/2010;  Comments: 8/20/2010       Family History   Problem Relation Age of Onset    Colon Polyps Mother     No Known Problems Father     No Known Problems Sister     No Known Problems Brother     Colon Polyps Maternal Aunt     Breast Cancer Paternal Grandmother     Ovarian Cancer Paternal Grandmother     Colon Polyps Other        Reviewed past medical, surgical, and family history with patient found on new patient intake packet located in EMR Media tab.     SOCIAL HX: Reviewed    ROS: Specifically negative for bowel/bladder dysfunction, balance changes, headache, dizziness, foot drop, fevers, chills, appetite changes, nausea/vomiting, unexplained weight loss. Otherwise 13 systems reviewed are negative. Please see the patient's intake questionnaire from today for details.    OBJECTIVE:  /82 (BP Location: Right arm, Patient Position: Sitting, Cuff Size: Adult Regular)   Pulse 78   Ht 6' (1.829 m)   Wt 195 lb (88.5 kg)   SpO2 98%   BMI 26.45 kg/m      PHYSICAL EXAMINATION:  --CONSTITUTIONAL: Vital signs as above. No acute distress. The patient is well nourished and well groomed.  --PSYCHIATRIC: The patient is awake, alert, oriented to person, place, time and answering questions appropriately with clear speech. Appropriate mood and affect   --RESPIRATORY: Normal rhythm and effort. No abnormal accessory muscle breathing patterns noted.   --GROSS MOTOR: Easily arises from a seated position.  --LUMBAR SPINE: Inspection reveals no evidence of deformity. Range of motion is not limited in flexion, extension, lateral rotation. Mild tenderness to palpation of lumbar paraspinals, no tenderness in spinous processes.  Facet loading (Marcum test) negative, seated SLR negative  --HIPS: Full range of motion bilaterally. Negative SURY test.  --LOWER EXTREMITY MOTOR TESTING:  Hip flexion left 5/5,  right 5/5  Knee extension left 5/5, right 5/5  Ankle dorsiflexors left 5/5, right 5/5  Ankle plantarflexors left 5/5, right 5/5   Great toe extension left 5/5, right 5/5   Knee flexion left 5/5, right 5/5  --NEUROLOGIC: Sensation to lower extremities is intact bilaterally in L2-S1 dermatomes.  Negative Palmer's bilaterally. Reflexes intact in BLE, no clonus.  --VASCULAR: Warm upper limbs bilaterally. Capillary refill in the upper extremities is less than 1 second.    RESULTS: Available medical records from [facility] and any other outside records were reviewed today.     Imaging:  Available relevant imaging was personally reviewed and interpreted today. The images were shown to the patient and the findings were explained using a spine model.    2/24/2023 MRI lumbar spine:    IMPRESSION:  1.  Dorsal annular fissures L3-L4.  2.  No spinal canal or foraminal compromise.

## 2024-02-21 NOTE — PATIENT INSTRUCTIONS
~Spine Center Scheduling #[phone].  ~Please call our [facility] Nurse Navigation #[phone] with any questions or concerns about your treatment plan, if symptoms worsen and you would like to be seen urgently, or if you have problems controlling bladder and bowel function.  ~For any future flareups or new symptoms, recommend follow-up in clinic or contact the nurse navigator line.  ~Please note that any My Chart messages may take multiple days for a response due to the high volume of patients seen in clinic.  Anything sent Friday night or after will be answered the following week when able.     ~You have been referred for Physical Therapy to [facility]. They will call you to schedule an appointment.      Scheduling phone number is [phone] for [facility], or [place] location.  If you have not heard from the scheduling office within 2 business days, please call [phone] for ALL other locations.    Discussed the importance of core strengthening, ROM, stretching exercises and how each of these entities is important in decreasing pain and improving long term spine health.  The purpose of physical therapy is to teach you an individualized home exercise program.  These exercises need to be performed every day in order to decrease pain and prevent future occurrences of pain.

## 2024-02-21 NOTE — LETTER
2/21/2024         RE: Mauricio Simon  [address] [facility] [id number]        Dear Colleague,    Thank you for referring your patient, Mauricio Simon, to the [facility]. Please see a copy of my visit note below.    ASSESSMENT:  Mauricio Simon is a 48 year old male presents for consultation at the request of Heath Thompson who presents today for new patient evaluation of :         Diagnoses and all orders for this visit:  Degeneration of lumbar intervertebral disc with acute herniation  -     methylPREDNISolone (MEDROL DOSEPAK) 4 MG tablet therapy pack; Follow Package Directions  -     Physical Therapy  Referral; Future  Acute low back pain, unspecified back pain laterality, unspecified whether sciatica present  -     Spine  Referral  -     methylPREDNISolone (MEDROL DOSEPAK) 4 MG tablet therapy pack; Follow Package Directions  -     Physical Therapy  Referral; Future  Chronic right-sided low back pain with right-sided sciatica  -     Spine  Referral       Patient is neurologically intact on exam. No myelopathic or red flag symptoms.    Patient presents with acute low back pain in the setting of recent bending and lifting.  He reports this is a recurrence of similar back pain he has had episodically for the last 10 years.  Pain worsens with bending or twisting, improves with rest and anti-inflammatories.  We reviewed his MRI which does show disc bulge at the L3-4 level and no significant spinal or foraminal stenoses.  We discussed that this may well be a recurrent disc herniation of the L3-4, which has been happening episodically for the last several years potentially with spontaneous regression.  For this episode we will start with anti-inflammatories and directional physical therapy.  Patient was advised that given the recurrence of these herniations, it will be very important moving forward for him to have a daily lumbar exercise  routine to reduce his risk of recurrence.  Patient is in agreement with this plan, and all questions were addressed.    PLAN:  Reviewed spine anatomy and disease process. Discussed diagnosis and treatment options with the patient today. A shared decision making model was used. The patient's values and choices were respected. The following represents what was discussed and decided upon by the provider and the patient.    1. DIAGNOSTIC TESTS  No new imaging orders at this time.    2. PHYSICAL THERAPY  Physical therapy was ordered through [facility] or the external clinic of patient's choice.  Discussed the importance of core strengthening, ROM, stretching exercises with the patient and how each of these entities is important in decreasing pain.  Explained to the patient that the purpose of physical therapy is to teach the patient a home exercise program.  These exercises need to be performed every day in order to decrease pain and prevent future occurrences of pain.  Likened it to brushing one's teeth.      3. MEDICATIONS:  Discussed multiple medication options today with patient. Discussed risks, side effects, and proper use of medications. Patient verbalized understanding.  Prescribed Medrol Dosepak today, reviewed dosing and side effects  If any side effects with Medrol, will plan to switch to Mobic or Celebrex  Patient advised to call our clinic's nurse navigation line (number provided) if they experience any side effects or intolerances with prescribed medication.    4. INTERVENTIONS:  Interventional treatments are not indicated for patient's presentation.    5. OTHER REFERRALS:  No other referrals at this time.    6. FOLLOW-UP  After completion of physical therapy.  Patient advised to contact our office for earlier appointment if symptoms worsening or not improving, or any side effects are noticed.    Advised patient to call the Spine Center if symptoms worsen or you have problems controlling bladder and bowel  function.   ______________________________________________________________________    SUBJECTIVE:   Mauricio Simon  is a 48 year old male who presents today for new patient evaluation.    Patient reports that he has been having acute episodes of back pain which are self-limited in nature for the last 10 years.  Pain usually comes on when he is twisting or bending to lift something, and in the acute.  He will have significant pain and stiffness in his back, and over time this will steadily improve and eventually resolves.  The current episode began about 3 days ago, when he was bending to lift something off the ground.  Since then he has been using Tylenol and ibuprofen alternating.  Pain worsens with twisting or bending or lifting.  Improves with rest and medication.  Denies any radicular symptoms, no numbness or weakness of legs, no bladder or bowel incontinence.  No prior back surgeries    -Treatment to Date: Did PT several years ago but has not been keeping up with home exercises, recently trying anti-inflammatories and Tylenol      Oswestry (NICKO) Questionnaire        2/20/2024     4:54 PM   OSWESTRY DISABILITY INDEX   Count 9   Sum 9   Oswestry Score (%) 20 %       Neck Disability Index:      2/20/2024     4:55 PM   Neck Disability Index (  [employer]ir[employer]. 1991. All rights reserved.; used with permission)   SECTION 1 - PAIN INTENSITY 0   SECTION 2 - PERSONAL CARE 0   SECTION 3 - LIFTING 0   SECTION 4 - READING 0   SECTION 5 - HEADACHES 0   SECTION 6 - CONCENTRATION 0   SECTION 7 - WORK 0   SECTION 8 - DRIVING 0   SECTION 9 - SLEEPING 0   SECTION 10 - RECREATION 0   Count 10   Sum 0   Raw Score: /50 0   Neck Disability Index Score: (%) 0 %              -Medications:    Current Outpatient Medications   Medication     methylPREDNISolone (MEDROL DOSEPAK) 4 MG tablet therapy pack     No current facility-administered medications for this visit.       No Known Allergies    No past medical history on file.      Patient Active Problem List   Diagnosis     Lower Back Pain     Midback Pain     Hyperlipidemia     Adenomatous colon polyp       Past Surgical History:   Procedure Laterality Date     C UNLISTED PROCEDURE, ABDOMEN/PERITONEUM/OMENTUM      Description: Hernia Repair;  Recorded: 09/30/2014;     REMOVAL OF SPERM DUCT(S)      Description: Surgery Of Male Genitalia Vasectomy;  Recorded: 08/20/2010;  Comments: 8/20/2010       Family History   Problem Relation Age of Onset     Colon Polyps Mother      No Known Problems Father      No Known Problems Sister      No Known Problems Brother      Colon Polyps Maternal Aunt      Breast Cancer Paternal Grandmother      Ovarian Cancer Paternal Grandmother      Colon Polyps Other        Reviewed past medical, surgical, and family history with patient found on new patient intake packet located in EMR Media tab.     SOCIAL HX: Reviewed    ROS: Specifically negative for bowel/bladder dysfunction, balance changes, headache, dizziness, foot drop, fevers, chills, appetite changes, nausea/vomiting, unexplained weight loss. Otherwise 13 systems reviewed are negative. Please see the patient's intake questionnaire from today for details.    OBJECTIVE:  /82 (BP Location: Right arm, Patient Position: Sitting, Cuff Size: Adult Regular)   Pulse 78   Ht 6' (1.829 m)   Wt 195 lb (88.5 kg)   SpO2 98%   BMI 26.45 kg/m      PHYSICAL EXAMINATION:  --CONSTITUTIONAL: Vital signs as above. No acute distress. The patient is well nourished and well groomed.  --PSYCHIATRIC: The patient is awake, alert, oriented to person, place, time and answering questions appropriately with clear speech. Appropriate mood and affect   --RESPIRATORY: Normal rhythm and effort. No abnormal accessory muscle breathing patterns noted.   --GROSS MOTOR: Easily arises from a seated position.  --LUMBAR SPINE: Inspection reveals no evidence of deformity. Range of motion is not limited in flexion, extension, lateral  rotation. Mild tenderness to palpation of lumbar paraspinals, no tenderness in spinous processes.  Facet loading (Marcum test) negative, seated SLR negative  --HIPS: Full range of motion bilaterally. Negative SURY test.  --LOWER EXTREMITY MOTOR TESTING:  Hip flexion left 5/5, right 5/5  Knee extension left 5/5, right 5/5  Ankle dorsiflexors left 5/5, right 5/5  Ankle plantarflexors left 5/5, right 5/5   Great toe extension left 5/5, right 5/5   Knee flexion left 5/5, right 5/5  --NEUROLOGIC: Sensation to lower extremities is intact bilaterally in L2-S1 dermatomes.  Negative Palmer's bilaterally. Reflexes intact in BLE, no clonus.  --VASCULAR: Warm upper limbs bilaterally. Capillary refill in the upper extremities is less than 1 second.    RESULTS: Available medical records from [facility] and any other outside records were reviewed today.     Imaging:  Available relevant imaging was personally reviewed and interpreted today. The images were shown to the patient and the findings were explained using a spine model.    2/24/2023 MRI lumbar spine:    IMPRESSION:  1.  Dorsal annular fissures L3-L4.  2.  No spinal canal or foraminal compromise.       Again, thank you for allowing me to participate in the care of your patient.        Sincerely,        Sam Doyle MD

## 2024-03-04 ENCOUNTER — PATIENT OUTREACH (OUTPATIENT)
Dept: GASTROENTEROLOGY | Facility: CLINIC | Age: 49
End: 2024-03-04
Payer: COMMERCIAL

## 2024-03-11 ENCOUNTER — THERAPY VISIT (OUTPATIENT)
Dept: PHYSICAL THERAPY | Facility: CLINIC | Age: 49
End: 2024-03-11
Attending: STUDENT IN AN ORGANIZED HEALTH CARE EDUCATION/TRAINING PROGRAM
Payer: COMMERCIAL

## 2024-03-11 DIAGNOSIS — M51.369 DEGENERATION OF LUMBAR INTERVERTEBRAL DISC WITH ACUTE HERNIATION: ICD-10-CM

## 2024-03-11 DIAGNOSIS — M54.50 ACUTE LOW BACK PAIN, UNSPECIFIED BACK PAIN LATERALITY, UNSPECIFIED WHETHER SCIATICA PRESENT: Primary | ICD-10-CM

## 2024-03-11 DIAGNOSIS — M51.26 DEGENERATION OF LUMBAR INTERVERTEBRAL DISC WITH ACUTE HERNIATION: ICD-10-CM

## 2024-03-11 PROCEDURE — 97161 PT EVAL LOW COMPLEX 20 MIN: CPT | Mod: GP | Performed by: PHYSICAL THERAPIST

## 2024-03-11 PROCEDURE — 97110 THERAPEUTIC EXERCISES: CPT | Mod: GP | Performed by: PHYSICAL THERAPIST

## 2024-03-11 NOTE — PROGRESS NOTES
PHYSICAL THERAPY EVALUATION  Type of Visit: Evaluation    See electronic medical record for Abuse and Falls Screening details.  Subjective       Presenting condition or subjective complaint:    Pain Started around 2/18/24 after bending and lifting something off of the ground. He has a h/o of similar episodic flares of pain, over the last 10 years with bending to lift or twisting. He will experience pain and stiffness in the back, that will gradually improve. He has done PT for this, last in 2018. He always has a low level chronic discomfort in the back. Pain is there all the time at night when he is laying on his back. Wakes up almost every night with pain. Usually flares take at least a week to recover, with the first few days being very debilitating. Prednisone helped to kick the pain quicker. Sits at work at a computer.   Pain Described as stabbing like pain with certain movements, shoots to the sides, predominantly to the right. Has had numbness in the anterior thighs a year or so ago, this did clear up on its own. No change in bowel/bladder control, LE weakness, numbness tingling or balance.  Worse with bending, lifting, twisting, being on his feet for a long period of time, yardwork.   Better with rest, tylenol and NSAIDs, being in an upright vs bent over position.  Does walk regularly and some road biking.    Date of onset: 02/21/24    Relevant medical history:     Dates & types of surgery:      Prior diagnostic imaging/testing results: MRI; X-ray     Prior therapy history for the same diagnosis, illness or injury: Yes Physical Therapy - 2018    Prior Level of Function  Transfers:   Ambulation:   ADL:   IADL:     Living Environment  Social support: With a significant other or spouse   Type of home: House   Stairs to enter the home: No       Ramp: No   Stairs inside the home: Yes 2 Is there a railing: Yes   Help at home: None  Equipment owned:       Employment: Yes    Hobbies/Interests:      Patient goals for  therapy:      Pain assessment:      Objective   Precautions/Restrictions: none  Involved side: bilat R>L  Posture Observation:      Sits in mildly slouched posture    Lumbar ROM:    Date: 3/11/2024     *Indicate scale AROM AROM AROM   Lumbar Flexion To toes     Lumbar Extension WNL      Right Left Right Left Right Left   Lumbar Sidebending WNL WNL       Lumbar Rotation WNL WNL       Thoracic Rotation           Lower Extremity Strength:     Date: 3/11/2024     LE strength/5 Right Left Right Left Right Left   Hip Flexion (L1-3) 5 5       Hip Extension (L5-S1) 5 5       Hip Abduction (L4-5) 5 5       Hip Adduction (L2-3)         Hip External Rotation         Hip Internal Rotation         Knee Extension (L3-4) 5 5       Knee Flexion         Ankle Dorsiflexion (L4-5) 5 5       Great Toe Extension (L5) 5 5       Ankle Plantar flexion (S1) 5 5       Abdominals        Sensation  intact to lt touch sensation screen Reflex Testing  Lumbar Dermatomes Right Left UE Reflexes Right Left   Iliac Crest and Groin (L1)   Biceps (C5-6)     Anterior Medial Thigh (L2)   Brachioradialis (C5-6)     Anterior Thigh, Medial Epicondyle Femur (L3)   Triceps (C7-8)     Lateral Thigh, Anterior Knee, Medial Leg/Malleolus (L4)   Ana Luisa's test     Lateral Leg, Dorsal Foot (L5)   LE Reflexes     Lateral Foot (S1)   Patellar (L3-4)     Posterior Leg (S2)   Achilles (S1-2)     Other:   Babinski Response       Palpation: mild tenderness to L>R lumbar paraspinals, bilateral QL    Lumbar Special Tests:     Lumbar Special Tests Right Left SI Tests Right  Left   Quadrant test   SI Compression     Straight leg raise 60 50  SI Distraction     Crossover response   POSH/Thigh Thrust Test - -   Slump   Sacral Thrust - -   Sit-up test  Gaenslen's - -   Trunk extensor endurance test  FADIR - -   Prone instability test  SURY - -   Pubic shotgun  Macy's - -     Repeated Motion Testing:  No change with repeated ext or flexion    Passive Mobility - Joint  Integrity:  Hypomobile with pain L3-4, L4-5    LE Screen:  Tightness in bilateral hamstrings. - jn test    All weight progressions in therapy sessions are dictated by the patient tolerance and therapist discretion. Testing on MedX equipment is completed on 4-week intervals to allow for physiological change in muscle structure and neuromuscular re-education.    Lumbar MedX Initial testing    AROM (full=  0-72  lumbar) 0-66   Max Extension Torque  441#   Flex: ext ratio (ideal 1.4:1) 2.94:1     Date 3/11/2024   Lumbar Parameters    Top Dead Center (TDC) 21   Counterbalance (CB) 480   Seat Pad 1   Femur Restraint 6   Week/Visit    Enter Week/Visit # 1/1   Weight (lbs) Test only   Reps (#)    Time    ROM (degrees) 0-66   Pain No inc   Therapist cues      Date 3/11/2024   Exercise    Treadmill X 5 min    Rotary torso  Next session   Hamstring stretch- seated or doorway X 30 seconds   Prone press ups X 10    Marie pose X 30 seconds                                 Modifications instructed by therapist:      Assessment & Plan   CLINICAL IMPRESSIONS  Medical Diagnosis: Acute low back pain, unspecified back pain laterality, unspecified whether sciatica present  Degeneration of lumbar intervertebral disc with acute herniation    Treatment Diagnosis: (P) low back pain, hamstring tightness, lumbar facet hypomobility   Impression/Assessment: Patient is a 49 year old male with low back pain with episodic flares complaints.  The following significant findings have been identified: Pain, Decreased ROM/flexibility, Decreased joint mobility, Decreased strength, Impaired muscle performance, Decreased activity tolerance, and Impaired posture. These impairments interfere with their ability to perform self care tasks, recreational activities, household chores, community mobility, and bending, lifting, and twisting  as compared to previous level of function.     Clinical Decision Making (Complexity):  Clinical Presentation:  Stable/Uncomplicated  Clinical Presentation Rationale: based on medical and personal factors listed in PT evaluation  Clinical Decision Making (Complexity): Low complexity    PLAN OF CARE  Treatment Interventions:  Interventions: Manual Therapy, Neuromuscular Re-education, Therapeutic Activity, Therapeutic Exercise, Self-Care/Home Management    Long Term Goals     PT Goal 1  Goal Description: (P) Patient will demonstrate ability to perform lumbar strengthening for HEP without increased pain in bending, for decrease in episodic flares, in 8 weeks:  Target Date: (P) 05/06/24  PT Goal 2  Goal Description: (P) Patient will report 50% reduction in pain in 8 weeks:  Target Date: (P) 05/06/24  PT Goal 3  Goal Description: (P) Patient will demonstrate HS mobility with SLR > 70 degrees for decrease in lumbar stress while bending in 8 weeks;  Target Date: (P) 05/06/24      Frequency of Treatment: 1-2X/week  Duration of Treatment: up to 16 visits    Recommended Referrals to Other Professionals:   Education Assessment:   Learner/Method: Patient  Education Comments: HEP, PT POC, Education on Medx program    Risks and benefits of evaluation/treatment have been explained.   Patient/Family/caregiver agrees with Plan of Care.     Evaluation Time:     PT Eval, Low Complexity Minutes (69822): (P) 20       Signing Clinician: Dez Wilkins, PT

## 2024-03-13 ENCOUNTER — THERAPY VISIT (OUTPATIENT)
Dept: PHYSICAL THERAPY | Facility: CLINIC | Age: 49
End: 2024-03-13
Payer: COMMERCIAL

## 2024-03-13 DIAGNOSIS — M51.26 DEGENERATION OF LUMBAR INTERVERTEBRAL DISC WITH ACUTE HERNIATION: ICD-10-CM

## 2024-03-13 DIAGNOSIS — M54.50 ACUTE LOW BACK PAIN, UNSPECIFIED BACK PAIN LATERALITY, UNSPECIFIED WHETHER SCIATICA PRESENT: Primary | ICD-10-CM

## 2024-03-13 DIAGNOSIS — M51.369 DEGENERATION OF LUMBAR INTERVERTEBRAL DISC WITH ACUTE HERNIATION: ICD-10-CM

## 2024-03-13 PROCEDURE — 97140 MANUAL THERAPY 1/> REGIONS: CPT | Mod: GP | Performed by: PHYSICAL THERAPIST

## 2024-03-13 PROCEDURE — 97110 THERAPEUTIC EXERCISES: CPT | Mod: GP | Performed by: PHYSICAL THERAPIST

## 2024-03-13 NOTE — PROGRESS NOTES
All weight progressions in therapy sessions are dictated by the patient tolerance and therapist discretion. Testing on MedX equipment is completed on 4-week intervals to allow for physiological change in muscle structure and neuromuscular re-education.    Lumbar MedX Initial testing    AROM (full=  0-72  lumbar) 0-66   Max Extension Torque  441#   Flex: ext ratio (ideal 1.4:1) 2.94:1     Date 3/13/2024 3/11/2024   Lumbar Parameters     Top Dead Center (TDC) 21 21   Counterbalance (CB) 480 480   Seat Pad 1 1   Femur Restraint 6 6   Week/Visit     Enter Week/Visit # 1/2 1/1   Weight (lbs) 155#'s Test only   Reps (#) 18    Time 142    ROM (degrees) 0-66 0-66   Pain  No inc   Therapist cues       Date 3/13/2024 3/11/2024   Exercise     Treadmill X 5 min X 5 min    Rotary torso  40#'s started R Next session   Hamstring stretch- seated or doorway  X 30 seconds   Prone press ups  X 10    Marie pose  X 30 seconds

## 2024-03-18 ENCOUNTER — THERAPY VISIT (OUTPATIENT)
Dept: PHYSICAL THERAPY | Facility: CLINIC | Age: 49
End: 2024-03-18
Payer: COMMERCIAL

## 2024-03-18 DIAGNOSIS — M51.369 DEGENERATION OF LUMBAR INTERVERTEBRAL DISC WITH ACUTE HERNIATION: ICD-10-CM

## 2024-03-18 DIAGNOSIS — M54.50 ACUTE LOW BACK PAIN, UNSPECIFIED BACK PAIN LATERALITY, UNSPECIFIED WHETHER SCIATICA PRESENT: Primary | ICD-10-CM

## 2024-03-18 DIAGNOSIS — M51.26 DEGENERATION OF LUMBAR INTERVERTEBRAL DISC WITH ACUTE HERNIATION: ICD-10-CM

## 2024-03-18 PROCEDURE — 97110 THERAPEUTIC EXERCISES: CPT | Mod: GP | Performed by: PHYSICAL THERAPIST

## 2024-03-18 NOTE — PROGRESS NOTES
All weight progressions in therapy sessions are dictated by the patient tolerance and therapist discretion. Testing on MedX equipment is completed on 4-week intervals to allow for physiological change in muscle structure and neuromuscular re-education.    Lumbar MedX Initial testing    AROM (full=  0-72  lumbar) 0-66   Max Extension Torque  441#   Flex: ext ratio (ideal 1.4:1) 2.94:1     Date 3/18/2024 3/13/2024 3/11/2024   Lumbar Parameters      Top Dead Center (TDC) 21 21 21   Counterbalance (CB) 480 480 480   Seat Pad 1 1 1   Femur Restraint 6 6 6   Week/Visit      Enter Week/Visit # 2/1 1/2 1/1   Weight (lbs) 158# 155#'s Test only   Reps (#) 19 18    Time 120 142    ROM (degrees) 0-66 0-66 0-66   Pain   No inc   Therapist cues        Date 3/18/2024 3/13/2024 3/11/2024   Exercise      Treadmill X 5 min X 5 min X 5 min    Rotary torso  44#'s started L 40#'s started R Next session   Hamstring stretch- seated or doorway   X 30 seconds   Prone press ups   X 10    Marie pose X 30 seconds, added SB   X 30 seconds   Straight leg deadlift 2 X 10 level 5 band

## 2024-03-20 ENCOUNTER — THERAPY VISIT (OUTPATIENT)
Dept: PHYSICAL THERAPY | Facility: CLINIC | Age: 49
End: 2024-03-20
Payer: COMMERCIAL

## 2024-03-20 DIAGNOSIS — M51.26 DEGENERATION OF LUMBAR INTERVERTEBRAL DISC WITH ACUTE HERNIATION: ICD-10-CM

## 2024-03-20 DIAGNOSIS — M54.50 ACUTE LOW BACK PAIN, UNSPECIFIED BACK PAIN LATERALITY, UNSPECIFIED WHETHER SCIATICA PRESENT: Primary | ICD-10-CM

## 2024-03-20 DIAGNOSIS — M51.369 DEGENERATION OF LUMBAR INTERVERTEBRAL DISC WITH ACUTE HERNIATION: ICD-10-CM

## 2024-03-20 PROCEDURE — 97110 THERAPEUTIC EXERCISES: CPT | Mod: GP | Performed by: PHYSICAL THERAPIST

## 2024-03-20 NOTE — PROGRESS NOTES
All weight progressions in therapy sessions are dictated by the patient tolerance and therapist discretion. Testing on MedX equipment is completed on 4-week intervals to allow for physiological change in muscle structure and neuromuscular re-education.    Lumbar MedX Initial testing    AROM (full=  0-72  lumbar) 0-66   Max Extension Torque  441#   Flex: ext ratio (ideal 1.4:1) 2.94:1     Date 3/20/2024 3/18/2024 3/13/2024 3/11/2024   Lumbar Parameters       Top Dead Center (TDC) 21 21 21 21   Counterbalance (CB) 480 480 480 480   Seat Pad 1 1 1 1   Femur Restraint 6 6 6 6   Week/Visit       Enter Week/Visit # 2/2 2/1 1/2 1/1   Weight (lbs) 162# 158# 155#'s Test only   Reps (#) 20 19 18    Time 138 120 142    ROM (degrees) 0-66 0-66 0-66 0-66   Pain    No inc   Therapist cues         Date 3/20/2024 3/18/2024 3/13/2024 3/11/2024   Exercise       Treadmill X 5 min  X 5 min X 5 min X 5 min    Rotary torso  48#'s started R 44#'s started L 40#'s started R Next session   Hamstring stretch- seated or doorway    X 30 seconds   Prone press ups    X 10    Marie pose  X 30 seconds, added SB   X 30 seconds   Straight leg deadlift  2 X 10 level 5 band     Yoga stretching routine- X 10 min

## 2024-03-25 ENCOUNTER — THERAPY VISIT (OUTPATIENT)
Dept: PHYSICAL THERAPY | Facility: CLINIC | Age: 49
End: 2024-03-25
Payer: COMMERCIAL

## 2024-03-25 DIAGNOSIS — M51.369 DEGENERATION OF LUMBAR INTERVERTEBRAL DISC WITH ACUTE HERNIATION: ICD-10-CM

## 2024-03-25 DIAGNOSIS — M54.50 ACUTE LOW BACK PAIN, UNSPECIFIED BACK PAIN LATERALITY, UNSPECIFIED WHETHER SCIATICA PRESENT: Primary | ICD-10-CM

## 2024-03-25 DIAGNOSIS — M51.26 DEGENERATION OF LUMBAR INTERVERTEBRAL DISC WITH ACUTE HERNIATION: ICD-10-CM

## 2024-03-25 PROCEDURE — 97110 THERAPEUTIC EXERCISES: CPT | Mod: GP | Performed by: PHYSICAL THERAPIST

## 2024-03-25 NOTE — PROGRESS NOTES
All weight progressions in therapy sessions are dictated by the patient tolerance and therapist discretion. Testing on MedX equipment is completed on 4-week intervals to allow for physiological change in muscle structure and neuromuscular re-education.    Lumbar MedX Initial testing    AROM (full=  0-72  lumbar) 0-66   Max Extension Torque  441#   Flex: ext ratio (ideal 1.4:1) 2.94:1     Date 3/25/2024 3/20/2024 3/18/2024 3/13/2024 3/11/2024   Lumbar Parameters        Top Dead Center (TDC) 21 21 21 21 21   Counterbalance (CB) 480 480 480 480 480   Seat Pad 1 1 1 1 1   Femur Restraint 6 6 6 6 6   Week/Visit        Enter Week/Visit # 3/1 2/2 2/1 1/2 1/1   Weight (lbs) 165# 162# 158# 155#'s Test only   Reps (#) 20 20 19 18    Time 115 138 120 142    ROM (degrees) 0-66 0-66 0-66 0-66 0-66   Pain     No inc   Therapist cues          Date 3/25/2024 3/20/2024 3/18/2024 3/13/2024 3/11/2024   Exercise        Treadmill X 5m in  X 5 min  X 5 min X 5 min X 5 min    Rotary torso  50#'s started L 48#'s started R 44#'s started L 40#'s started R Next session   Hamstring stretch- seated or doorway     X 30 seconds   Prone press ups     X 10    Marie pose   X 30 seconds, added SB   X 30 seconds   Straight leg deadlift   2 X 10 level 5 band     Yoga stretching routine-  X 10 min      Reformer all springs X 10 DL X 10 SL all springs       Reformer pulldowns 45 deg legs X 10        Reformer hamstring stretch  X 10 2R       Reformer hip flexor stretch X 10 2R

## 2024-04-01 ENCOUNTER — THERAPY VISIT (OUTPATIENT)
Dept: PHYSICAL THERAPY | Facility: CLINIC | Age: 49
End: 2024-04-01
Payer: COMMERCIAL

## 2024-04-01 DIAGNOSIS — M51.26 DEGENERATION OF LUMBAR INTERVERTEBRAL DISC WITH ACUTE HERNIATION: ICD-10-CM

## 2024-04-01 DIAGNOSIS — M54.50 ACUTE LOW BACK PAIN, UNSPECIFIED BACK PAIN LATERALITY, UNSPECIFIED WHETHER SCIATICA PRESENT: Primary | ICD-10-CM

## 2024-04-01 DIAGNOSIS — M51.369 DEGENERATION OF LUMBAR INTERVERTEBRAL DISC WITH ACUTE HERNIATION: ICD-10-CM

## 2024-04-01 PROCEDURE — 97110 THERAPEUTIC EXERCISES: CPT | Mod: GP | Performed by: PHYSICAL THERAPIST

## 2024-04-01 NOTE — PROGRESS NOTES
All weight progressions in therapy sessions are dictated by the patient tolerance and therapist discretion. Testing on MedX equipment is completed on 4-week intervals to allow for physiological change in muscle structure and neuromuscular re-education.    Lumbar MedX Initial testing    AROM (full=  0-72  lumbar) 0-66   Max Extension Torque  441#   Flex: ext ratio (ideal 1.4:1) 2.94:1     Date 4/1/2024 3/25/2024 3/20/2024 3/18/2024 3/13/2024 3/11/2024   Lumbar Parameters         Top Dead Center (TDC) 21 21 21 21 21 21   Counterbalance (CB) 480 480 480 480 480 480   Seat Pad 1 1 1 1 1 1   Femur Restraint 6 6 6 6 6 6   Week/Visit         Enter Week/Visit # 3/2 3/1 2/2 2/1 1/2 1/1   Weight (lbs) 170#'s  165# 162# 158# 155#'s Test only   Reps (#) 20 20 20 19 18    Time 123 115 138 120 142    ROM (degrees) 0-66 0-66 0-66 0-66 0-66 0-66   Pain      No inc   Therapist cues         T  Date 4/1/2024 3/25/2024 3/20/2024 3/18/2024 3/13/2024 3/11/2024   Exercise         Treadmill X 5min  X 5m in  X 5 min  X 5 min X 5 min X 5 min    Rotary torso  52#'s started R 50#'s started L 48#'s started R 44#'s started L 40#'s started R Next session   Hamstring stretch- seated or doorway      X 30 seconds   Prone press ups      X 10    Marie pose    X 30 seconds, added SB   X 30 seconds   Straight leg deadlift    2 X 10 level 5 band     Yoga stretching routine-   X 10 min      Reformer [place] springs  X 10 DL X 10 SL all springs       Reformer pulldowns 45 deg legs  X 10        Reformer hamstring stretch  X 10 2R X 10 2R       Reformer hip flexor stretch X 10 2R X 10 2R       TA SLR  TA 9090 marching X 10   X 10 cues on form

## 2024-04-04 ENCOUNTER — THERAPY VISIT (OUTPATIENT)
Dept: PHYSICAL THERAPY | Facility: CLINIC | Age: 49
End: 2024-04-04
Payer: COMMERCIAL

## 2024-04-04 DIAGNOSIS — M51.369 DEGENERATION OF LUMBAR INTERVERTEBRAL DISC WITH ACUTE HERNIATION: ICD-10-CM

## 2024-04-04 DIAGNOSIS — M54.50 ACUTE LOW BACK PAIN, UNSPECIFIED BACK PAIN LATERALITY, UNSPECIFIED WHETHER SCIATICA PRESENT: Primary | ICD-10-CM

## 2024-04-04 DIAGNOSIS — M51.26 DEGENERATION OF LUMBAR INTERVERTEBRAL DISC WITH ACUTE HERNIATION: ICD-10-CM

## 2024-04-04 PROCEDURE — 97110 THERAPEUTIC EXERCISES: CPT | Mod: GP

## 2024-04-04 NOTE — PROGRESS NOTES
All weight progressions in therapy sessions are dictated by the patient tolerance and therapist discretion. Testing on MedX equipment is completed on 4-week intervals to allow for physiological change in muscle structure and neuromuscular re-education.    Lumbar MedX Initial testing    AROM (full=  0-72  lumbar) 0-66   Max Extension Torque  441#   Flex: ext ratio (ideal 1.4:1) 2.94:1     Date 4/4/24 4/1/2024 3/25/2024 3/20/2024 3/18/2024 3/13/2024 3/11/2024   Lumbar Parameters          Top Dead Center (TDC) 21 21 21 21 21 21 21   Counterbalance (CB) 480 480 480 480 480 480 480   Seat Pad 1 1 1 1 1 1 1   Femur Restraint 6 6 6 6 6 6 6   Week/Visit          Enter Week/Visit # 4/1 3/2 3/1 2/2 2/1 1/2 1/1   Weight (lbs) 175#s 170#'s  165# 162# 158# 155#'s Test only   Reps (#) 20 20 20 20 19 18    Time 124 123 115 138 120 142    ROM (degrees) 0-66 0-66 0-66 0-66 0-66 0-66 0-66   Pain Muscle fatigue       No inc   Therapist cues            Date 4/4/24 4/1/2024 3/25/2024 3/20/2024 3/18/2024 3/13/2024 3/11/2024   Exercise          Treadmill X5 min X 5min  X 5m in  X 5 min  X 5 min X 5 min X 5 min    Rotary torso  54#s started L 52#'s started R 50#'s started L 48#'s started R 44#'s started L 40#'s started R Next session   Hamstring stretch- seated or doorway       X 30 seconds   Prone press ups       X 10    Marie pose     X 30 seconds, added SB   X 30 seconds   Straight leg deadlift     2 X 10 level 5 band     Yoga stretching routine-    X 10 min      Reformer [place] springs   X 10 DL X 10 SL all springs       Reformer pulldowns 45 deg legs   X 10        Reformer hamstring stretch   X 10 2R X 10 2R       Reformer hip flexor stretch  X 10 2R X 10 2R       TA SLR  TA 9090 marching  X 10   X 10 cues on form        TrA engagement  X10 in supine; x10 in sitting         All 4s lift X5/limb

## 2024-04-08 ENCOUNTER — THERAPY VISIT (OUTPATIENT)
Dept: PHYSICAL THERAPY | Facility: CLINIC | Age: 49
End: 2024-04-08
Payer: COMMERCIAL

## 2024-04-08 DIAGNOSIS — M51.26 DEGENERATION OF LUMBAR INTERVERTEBRAL DISC WITH ACUTE HERNIATION: ICD-10-CM

## 2024-04-08 DIAGNOSIS — M54.50 ACUTE LOW BACK PAIN, UNSPECIFIED BACK PAIN LATERALITY, UNSPECIFIED WHETHER SCIATICA PRESENT: Primary | ICD-10-CM

## 2024-04-08 DIAGNOSIS — M51.369 DEGENERATION OF LUMBAR INTERVERTEBRAL DISC WITH ACUTE HERNIATION: ICD-10-CM

## 2024-04-08 PROCEDURE — 97110 THERAPEUTIC EXERCISES: CPT | Mod: GP | Performed by: PHYSICAL THERAPIST

## 2024-04-08 NOTE — PROGRESS NOTES
All weight progressions in therapy sessions are dictated by the patient tolerance and therapist discretion. Testing on MedX equipment is completed on 4-week intervals to allow for physiological change in muscle structure and neuromuscular re-education.    Lumbar MedX 4/8/2024 Initial testing    AROM (full=  0-72  lumbar) 0-66 0-66   Max Extension Torque  443# 441#   Flex: ext ratio (ideal 1.4:1) 1.49:1 2.94:1     Date 4/8/2024 4/4/24 4/1/2024 3/25/2024 3/20/2024 3/18/2024 3/13/2024 3/11/2024   Lumbar Parameters           Top Dead Center (TDC) 21 21 21 21 21 21 21 21   Counterbalance (CB) 480 480 480 480 480 480 480 480   Seat Pad 1 1 1 1 1 1 1 1   Femur Restraint 6 6 6 6 6 6 6 6   Week/Visit           Enter Week/Visit # 4/2 4/1 3/2 3/1 2/2 2/1 1/2 1/1   Weight (lbs) 175# 175#s 170#'s  165# 162# 158# 155#'s Test only   Reps (#) 17 20 20 20 20 19 18    Time 88 124 123 115 138 120 142    ROM (degrees) 0-66 0-66 0-66 0-66 0-66 0-66 0-66 0-66   Pain  Muscle fatigue       No inc   Therapist cues             Date 4/8/2024 4/4/24 4/1/2024 3/25/2024 3/20/2024 3/18/2024 3/13/2024 3/11/2024   Exercise           Treadmill X 5 min X5 min X 5min  X 5m in  X 5 min  X 5 min X 5 min X 5 min    Rotary torso  56#'s started R 54#s started L 52#'s started R 50#'s started L 48#'s started R 44#'s started L 40#'s started R Next session   Hamstring stretch- seated or doorway        X 30 seconds   Prone press ups X 10       X 10    Marie pose X 30 seconds     X 30 seconds, added SB   X 30 seconds   Straight leg deadlift      2 X 10 level 5 band     Yoga stretching routine-     X 10 min      Reformer all springs    X 10 DL X 10 SL all springs       Reformer pulldowns 45 deg legs    X 10        Reformer hamstring stretch    X 10 2R X 10 2R       Reformer hip flexor stretch   X 10 2R X 10 2R       TA SLR  TA 9090 marching X 10 90/90 march  X 10   X 10 cues on form        TrA engagement   X10 in supine; x10 in sitting         All 4s lift   X5/limb         Russian twists X 10 with 10# weight          [place] Carry X 2 min with 2 10# weights.

## 2024-04-11 ENCOUNTER — THERAPY VISIT (OUTPATIENT)
Dept: PHYSICAL THERAPY | Facility: CLINIC | Age: 49
End: 2024-04-11
Payer: COMMERCIAL

## 2024-04-11 DIAGNOSIS — M54.50 ACUTE LOW BACK PAIN, UNSPECIFIED BACK PAIN LATERALITY, UNSPECIFIED WHETHER SCIATICA PRESENT: Primary | ICD-10-CM

## 2024-04-11 DIAGNOSIS — M51.369 DEGENERATION OF LUMBAR INTERVERTEBRAL DISC WITH ACUTE HERNIATION: ICD-10-CM

## 2024-04-11 DIAGNOSIS — M51.26 DEGENERATION OF LUMBAR INTERVERTEBRAL DISC WITH ACUTE HERNIATION: ICD-10-CM

## 2024-04-11 PROCEDURE — 97110 THERAPEUTIC EXERCISES: CPT | Mod: GP

## 2024-04-11 NOTE — PROGRESS NOTES
All weight progressions in therapy sessions are dictated by the patient tolerance and therapist discretion. Testing on MedX equipment is completed on 4-week intervals to allow for physiological change in muscle structure and neuromuscular re-education.    Lumbar MedX 4/8/2024 Initial testing    AROM (full=  0-72  lumbar) 0-66 0-66   Max Extension Torque  443# 441#   Flex: ext ratio (ideal 1.4:1) 1.49:1 2.94:1     Date 4/11/24 4/8/2024 4/4/24 4/1/2024 3/25/2024 3/20/2024 3/18/2024 3/13/2024 3/11/2024   Lumbar Parameters            Top Dead Center (TDC) 21 21 21 21 21 21 21 21 21   Counterbalance (CB) 480 480 480 480 480 480 480 480 480   Seat Pad 1 1 1 1 1 1 1 1 1   Femur Restraint 6 6 6 6 6 6 6 6 6   Week/Visit            Enter Week/Visit # 5 4/2 4/1 3/2 3/1 2/2 2/1 1/2 1/1   Weight (lbs) 178# 175# 175#s 170#'s  165# 162# 158# 155#'s Test only   Reps (#) 20 17 20 20 20 20 19 18    Time 113 88 124 123 115 138 120 142    ROM (degrees) 0-66 0-66 0-66 0-66 0-66 0-66 0-66 0-66 0-66   Pain   Muscle fatigue       No inc   Therapist cues              Date 4/11/24 4/8/2024 4/4/24 4/1/2024 3/25/2024 3/20/2024 3/18/2024 3/13/2024 3/11/2024   Exercise            Treadmill X5 min X 5 min X5 min X 5min  X 5m in  X 5 min  X 5 min X 5 min X 5 min    Rotary torso  56#s started L 56#'s started R 54#s started L 52#'s started R 50#'s started L 48#'s started R 44#'s started L 40#'s started R Next session   Hamstring stretch- seated or doorway         X 30 seconds   Prone press ups  X 10       X 10    Marie pose  X 30 seconds     X 30 seconds, added SB   X 30 seconds   Straight leg deadlift       2 X 10 level 5 band     Yoga stretching routine-      X 10 min      Reformer all springs     X 10 DL X 10 SL all springs       Reformer pulldowns 45 deg legs     X 10        Reformer hamstring stretch     X 10 2R X 10 2R       Reformer hip flexor stretch    X 10 2R X 10 2R       TA SLR  TA 9090 marching  X 10 90/90 march  X 10   X 10 cues on  form        TrA engagement    X10 in supine; x10 in sitting         All 4s lift   X5/limb         Russian twists  X 10 with 10# weight          [place] Carry X1 min/side with 5# above head, 10# at side X 2 min with 2 10# weights.          Pallof press X10 GTB +15 sec hold; x10 black TB rotation

## 2024-04-25 ENCOUNTER — THERAPY VISIT (OUTPATIENT)
Dept: PHYSICAL THERAPY | Facility: CLINIC | Age: 49
End: 2024-04-25
Payer: COMMERCIAL

## 2024-04-25 DIAGNOSIS — M54.50 LUMBAGO: Primary | ICD-10-CM

## 2024-04-25 PROCEDURE — 97110 THERAPEUTIC EXERCISES: CPT | Mod: GP | Performed by: PHYSICAL THERAPIST

## 2024-04-25 NOTE — PROGRESS NOTES
All weight progressions in therapy sessions are dictated by the patient tolerance and therapist discretion. Testing on MedX equipment is completed on 4-week intervals to allow for physiological change in muscle structure and neuromuscular re-education.    Lumbar MedX 4/8/2024 Initial testing    AROM (full=  0-72  lumbar) 0-66 0-66   Max Extension Torque  443# 441#   Flex: ext ratio (ideal 1.4:1) 1.49:1 2.94:1     Date 4/25/24 4/11/24 4/8/2024 4/4/24 4/1/2024 3/25/2024 3/20/2024 3/18/2024 3/13/2024 3/11/2024   Lumbar Parameters             Top Dead Center (TDC) 21 21 21 21 21 21 21 21 21 21   Counterbalance (CB) 480 480 480 480 480 480 480 480 480 480   Seat Pad 1 1 1 1 1 1 1 1 1 1   Femur Restraint 6 6 6 6 6 6 6 6 6 6   Week/Visit             Enter Week/Visit # 5/1 5 4/2 4/1 3/2 3/1 2/2 2/1 1/2 1/1   Weight (lbs) 182# 178# 175# 175#s 170#'s  165# 162# 158# 155#'s Test only   Reps (#)  20 17 20 20 20 20 19 18    Time  113 88 124 123 115 138 120 142    ROM (degrees) 0-66 0-66 0-66 0-66 0-66 0-66 0-66 0-66 0-66 0-66   Pain    Muscle fatigue       No inc   Therapist cues               Date 4/25/24 4/11/24 4/8/2024 4/4/24 4/1/2024 3/25/2024 3/20/2024 3/18/2024 3/13/2024 3/11/2024   Exercise             Treadmill X5 min  X5 min X 5 min X5 min X 5min  X 5m in  X 5 min  X 5 min X 5 min X 5 min    Rotary torso  56 started R  56#s started L 56#'s started R 54#s started L 52#'s started R 50#'s started L 48#'s started R 44#'s started L 40#'s started R Next session   Hamstring stretch- seated or doorway          X 30 seconds   Prone press ups   X 10       X 10    Marie pose   X 30 seconds     X 30 seconds, added SB   X 30 seconds   Straight leg deadlift        2 X 10 level 5 band     Yoga stretching routine-       X 10 min      Reformer all springs      X 10 DL X 10 SL all springs       Reformer pulldowns 45 deg legs      X 10        Reformer hamstring stretch      X 10 2R X 10 2R       Reformer hip flexor stretch     X 10 2R X  10 2R       TA SLR  TA 9090 marching Progressed to LE extending further   X 10 90/90 march  X 10   X 10 cues on form        TrA engagement     X10 in supine; x10 in sitting         All 4s lift Progressed with UE/LE    X5/limb         Russian twists   X 10 with 10# weight          [place] Carry  X1 min/side with 5# above head, 10# at side X 2 min with 2 10# weights.          Pallof press  X10 GTB +15 sec hold; x10 black TB rotation

## 2024-04-29 ENCOUNTER — THERAPY VISIT (OUTPATIENT)
Dept: PHYSICAL THERAPY | Facility: CLINIC | Age: 49
End: 2024-04-29
Payer: COMMERCIAL

## 2024-04-29 DIAGNOSIS — M51.369 DEGENERATION OF LUMBAR INTERVERTEBRAL DISC WITH ACUTE HERNIATION: ICD-10-CM

## 2024-04-29 DIAGNOSIS — M54.50 ACUTE LOW BACK PAIN, UNSPECIFIED BACK PAIN LATERALITY, UNSPECIFIED WHETHER SCIATICA PRESENT: Primary | ICD-10-CM

## 2024-04-29 DIAGNOSIS — M51.26 DEGENERATION OF LUMBAR INTERVERTEBRAL DISC WITH ACUTE HERNIATION: ICD-10-CM

## 2024-04-29 PROCEDURE — 97110 THERAPEUTIC EXERCISES: CPT | Mod: GP | Performed by: PHYSICAL THERAPIST

## 2024-04-29 PROCEDURE — 97140 MANUAL THERAPY 1/> REGIONS: CPT | Mod: GP | Performed by: PHYSICAL THERAPIST

## 2024-04-29 NOTE — PROGRESS NOTES
All weight progressions in therapy sessions are dictated by the patient tolerance and therapist discretion. Testing on MedX equipment is completed on 4-week intervals to allow for physiological change in muscle structure and neuromuscular re-education.    Lumbar MedX 4/8/2024 Initial testing    AROM (full=  0-72  lumbar) 0-66 0-66   Max Extension Torque  443# 441#   Flex: ext ratio (ideal 1.4:1) 1.49:1 2.94:1     Date 4/29/2024 4/25/24 4/11/24 4/8/2024 4/4/24 4/1/2024 3/25/2024 3/20/2024 3/18/2024 3/13/2024 3/11/2024   Lumbar Parameters              Top Dead Center (TDC) 21 21 21 21 21 21 21 21 21 21 21   Counterbalance (CB) 480 480 480 480 480 480 480 480 480 480 480   Seat Pad 1 1 1 1 1 1 1 1 1 1 1   Femur Restraint 6 6 6 6 6 6 6 6 6 6 6   Week/Visit              Enter Week/Visit # 6/1 5/1 5 4/2 4/1 3/2 3/1 2/2 2/1 1/2 1/1   Weight (lbs) 160# 182# 178# 175# 175#s 170#'s  165# 162# 158# 155#'s Test only   Reps (#) 20  20 17 20 20 20 20 19 18    Time -  113 88 124 123 115 138 120 142    ROM (degrees) 0-66 0-66 0-66 0-66 0-66 0-66 0-66 0-66 0-66 0-66 0-66   Pain     Muscle fatigue       No inc   Therapist cues                Date 4/29/2024 4/25/24 4/11/24 4/8/2024 4/4/24 4/1/2024 3/25/2024 3/20/2024 3/18/2024 3/13/2024 3/11/2024   Exercise              Treadmill X 5 min X5 min  X5 min X 5 min X5 min X 5min  X 5m in  X 5 min  X 5 min X 5 min X 5 min    Rotary torso   56#'s  started R  56#s started L 56#'s started R 54#s started L 52#'s started R 50#'s started L 48#'s started R 44#'s started L 40#'s started R Next session   Hamstring stretch- seated or doorway Long sitting X 30 seconds          X 30 seconds   Prone press ups X 10    X 10       X 10    Marie pose X 30 seconds   X 30 seconds     X 30 seconds, added SB   X 30 seconds   Straight leg deadlift         2 X 10 level 5 band     Yoga stretching routine-        X 10 min      Reformer all springs       X 10 DL X 10 SL all springs       Reformer pulldowns 45 deg  legs       X 10        Reformer hamstring stretch       X 10 2R X 10 2R       Reformer hip flexor stretch      X 10 2R X 10 2R       TA SLR  TA 9090 marching  Progressed to LE extending further   X 10 90/90 march  X 10   X 10 cues on form        TrA engagement      X10 in supine; x10 in sitting         All 4s lift  Progressed with UE/LE    X5/limb         Russian twists    X 10 with 10# weight          [place] Carry   X1 min/side with 5# above head, 10# at side X 2 min with 2 10# weights.          Pallof press   X10 GTB +15 sec hold; x10 black TB rotation

## 2024-05-06 ENCOUNTER — THERAPY VISIT (OUTPATIENT)
Dept: PHYSICAL THERAPY | Facility: CLINIC | Age: 49
End: 2024-05-06
Payer: COMMERCIAL

## 2024-05-06 DIAGNOSIS — M51.369 DEGENERATION OF LUMBAR INTERVERTEBRAL DISC WITH ACUTE HERNIATION: ICD-10-CM

## 2024-05-06 DIAGNOSIS — M51.26 DEGENERATION OF LUMBAR INTERVERTEBRAL DISC WITH ACUTE HERNIATION: ICD-10-CM

## 2024-05-06 DIAGNOSIS — M54.50 ACUTE LOW BACK PAIN, UNSPECIFIED BACK PAIN LATERALITY, UNSPECIFIED WHETHER SCIATICA PRESENT: Primary | ICD-10-CM

## 2024-05-06 PROCEDURE — 97110 THERAPEUTIC EXERCISES: CPT | Mod: GP | Performed by: PHYSICAL THERAPIST

## 2024-05-06 NOTE — PROGRESS NOTES
All weight progressions in therapy sessions are dictated by the patient tolerance and therapist discretion. Testing on MedX equipment is completed on 4-week intervals to allow for physiological change in muscle structure and neuromuscular re-education.    Lumbar MedX 4/8/2024 Initial testing    AROM (full=  0-72  lumbar) 0-66 0-66   Max Extension Torque  443# 441#   Flex: ext ratio (ideal 1.4:1) 1.49:1 2.94:1     Date 5/6/2024 4/29/2024 4/25/24 4/11/24 4/8/2024 4/4/24 4/1/2024 3/25/2024 3/20/2024 3/18/2024 3/13/2024 3/11/2024   Lumbar Parameters               Top Dead Center (TDC) 21 21 21 21 21 21 21 21 21 21 21 21   Counterbalance (CB) 480 480 480 480 480 480 480 480 480 480 480 480   Seat Pad 1 1 1 1 1 1 1 1 1 1 1 1   Femur Restraint 6 6 6 6 6 6 6 6 6 6 6 6   Week/Visit               Enter Week/Visit # 7/1 6/1 5/1 5 4/2 4/1 3/2 3/1 2/2 2/1 1/2 1/1   Weight (lbs) 172# 160# 182# 178# 175# 175#s 170#'s  165# 162# 158# 155#'s Test only   Reps (#) 20 20  20 17 20 20 20 20 19 18    Time 126 -  113 88 124 123 115 138 120 142    ROM (degrees) 0-66 0-66 0-66 0-66 0-66 0-66 0-66 0-66 0-66 0-66 0-66 0-66   Pain      Muscle fatigue       No inc   Therapist cues                 Date 5/6/2024 4/29/2024 4/25/24 4/11/24 4/8/2024 4/4/24 4/1/2024 3/25/2024 3/20/2024 3/18/2024 3/13/2024 3/11/2024   Exercise               Treadmill X 5 min X 5 min X5 min  X5 min X 5 min X5 min X 5min  X 5m in  X 5 min  X 5 min X 5 min X 5 min    Rotary torso  58#'s started L  56#'s  started R  56#s started L 56#'s started R 54#s started L 52#'s started R 50#'s started L 48#'s started R 44#'s started L 40#'s started R Next session   Hamstring stretch- seated or doorway  Long sitting X 30 seconds          X 30 seconds   Prone press ups  X 10    X 10       X 10    Marie pose  X 30 seconds   X 30 seconds     X 30 seconds, added SB   X 30 seconds   Straight leg deadlift          2 X 10 level 5 band     Yoga stretching routine-         X 10 min       Reformer all springs leg press X 20 all springs       X 10 DL X 10 SL all springs       Reformer pulldowns 45 deg legs X 10 2R 1B       X 10        Reformer hamstring stretch  X 10 2R      X 10 2R X 10 2R       Reformer hip flexor stretch       X 10 2R X 10 2R       Reformer bridges  X 10 Hamstring               TA SLR  TA 9090 marching   Progressed to LE extending further   X 10 90/90 march  X 10   X 10 cues on form        TrA engagement       X10 in supine; x10 in sitting         All 4s lift   Progressed with UE/LE    X5/limb         Russian twists     X 10 with 10# weight          [place] Carry    X1 min/side with 5# above head, 10# at side X 2 min with 2 10# weights.          Pallof press    X10 GTB +15 sec hold; x10 black TB rotation

## 2024-05-13 ENCOUNTER — THERAPY VISIT (OUTPATIENT)
Dept: PHYSICAL THERAPY | Facility: CLINIC | Age: 49
End: 2024-05-13
Payer: COMMERCIAL

## 2024-05-13 DIAGNOSIS — M54.50 ACUTE LOW BACK PAIN, UNSPECIFIED BACK PAIN LATERALITY, UNSPECIFIED WHETHER SCIATICA PRESENT: Primary | ICD-10-CM

## 2024-05-13 DIAGNOSIS — M51.369 DEGENERATION OF LUMBAR INTERVERTEBRAL DISC WITH ACUTE HERNIATION: ICD-10-CM

## 2024-05-13 DIAGNOSIS — M51.26 DEGENERATION OF LUMBAR INTERVERTEBRAL DISC WITH ACUTE HERNIATION: ICD-10-CM

## 2024-05-13 PROCEDURE — 97110 THERAPEUTIC EXERCISES: CPT | Mod: GP | Performed by: PHYSICAL THERAPIST

## 2024-05-13 NOTE — PROGRESS NOTES
All weight progressions in therapy sessions are dictated by the patient tolerance and therapist discretion. Testing on MedX equipment is completed on 4-week intervals to allow for physiological change in muscle structure and neuromuscular re-education.    Lumbar MedX 5/13/2024 4/8/2024 Initial testing    AROM (full=  0-72  lumbar) 0-66 0-66 0-66   Max Extension Torque  514# 443# 441#   Flex: ext ratio (ideal 1.4:1) 2.39:1 1.49:1 2.94:1     Date 5/13/2024 5/6/2024 4/29/2024 4/25/24 4/11/24 4/8/2024 4/4/24 4/1/2024 3/25/2024 3/20/2024 3/18/2024 3/13/2024 3/11/2024   Lumbar Parameters                Top Dead Center (TDC) 21 21 21 21 21 21 21 21 21 21 21 21 21   Counterbalance (CB) 480 480 480 480 480 480 480 480 480 480 480 480 480   Seat Pad 1 1 1 1 1 1 1 1 1 1 1 1 1   Femur Restraint 6 6 6 6 6 6 6 6 6 6 6 6 6   Week/Visit                Enter Week/Visit # 8/1 7/1 6/1 5/1 5 4/2 4/1 3/2 3/1 2/2 2/1 1/2 1/1   Weight (lbs) 172# 172# 160# 182# 178# 175# 175#s 170#'s  165# 162# 158# 155#'s Test only   Reps (#) 20 20 20  20 17 20 20 20 20 19 18    Time 99 126 -  113 88 124 123 115 138 120 142    ROM (degrees) 0-66 0-66 0-66 0-66 0-66 0-66 0-66 0-66 0-66 0-66 0-66 0-66 0-66   Pain       Muscle fatigue       No inc   Therapist cues                  Date 5/13/2024 5/6/2024 4/29/2024 4/25/24 4/11/24 4/8/2024 4/4/24 4/1/2024 3/25/2024 3/20/2024 3/18/2024 3/13/2024 3/11/2024   Exercise                Treadmill X 5 min  X 5 min X 5 min X5 min  X5 min X 5 min X5 min X 5min  X 5m in  X 5 min  X 5 min X 5 min X 5 min    Rotary torso  58#'s started R 58#'s started L  56#'s  started R  56#s started L 56#'s started R 54#s started L 52#'s started R 50#'s started L 48#'s started R 44#'s started L 40#'s started R Next session   Hamstring stretch- seated or doorway   Long sitting X 30 seconds          X 30 seconds   Prone press ups   X 10    X 10       X 10    Marie pose   X 30 seconds   X 30 seconds     X 30 seconds, added SB   X 30  seconds   Straight leg deadlift           2 X 10 level 5 band     Yoga stretching routine-          X 10 min      Reformer all springs leg press  X 20 all springs       X 10 DL X 10 SL all springs       Reformer pulldowns 45 deg legs  X 10 2R 1B       X 10        Reformer hamstring stretch   X 10 2R      X 10 2R X 10 2R       Reformer hip flexor stretch        X 10 2R X 10 2R       Reformer bridges   X 10 Hamstring               TA SLR  TA 9090 marching    Progressed to LE extending further   X 10 90/90 march  X 10   X 10 cues on form        TrA engagement        X10 in supine; x10 in sitting         All 4s lift    Progressed with UE/LE    X5/limb         Russian twists      X 10 with 10# weight          [place] Carry     X1 min/side with 5# above head, 10# at side X 2 min with 2 10# weights.          Pallof press     X10 GTB +15 sec hold; x10 black TB rotation

## 2024-06-19 NOTE — PROGRESS NOTES
DISCHARGE  Reason for Discharge: Patient has met all goals.    Equipment Issued: NA    Discharge Plan: Patient to continue home program.    Referring Provider:  Sam Doyle       05/13/24 [id number]   Appointment Info   Signing clinician's name / credentials Dez Wilkins, PT   Visits Used 13/16   Medical Diagnosis Acute low back pain, unspecified back pain laterality, unspecified whether sciatica present  Degeneration of lumbar intervertebral disc with acute herniation   PT Tx Diagnosis low back pain, hamstring tightness, lumbar facet hypomobility   Precautions/Limitations none   Progress Note/Certification   Onset of illness/injury or Date of Surgery 02/21/24   Therapy Frequency 1-2X/week   Predicted Duration up to 16 visits   Progress Note Due Date 05/06/24   GOALS   PT Goals 2;3;4   PT Goal 1   Goal Description Patient will demonstrate ability to perform lumbar strengthening for HEP without increased pain in bending, for decrease in episodic flares, in 8 weeks:   Goal Progress Met   Target Date 05/06/24   Date Met 05/13/24   PT Goal 2   Goal Description Patient will report 50% reduction in pain in 8 weeks:   Goal Progress Met   Target Date 05/06/24   Date Met 05/13/24   PT Goal 3   Goal Description Patient will demonstrate HS mobility with SLR > 70 degrees for decrease in lumbar stress while bending in 8 weeks;   Goal Progress Met on right 70 deg, 64 deg on left   Target Date 05/06/24   Subjective Report   Subjective Report Doing better than last week.   Objective Measures   Objective Measures Objective Measure 1   Objective Measure 1   Objective Measure SLR R/L 70/64   Treatment Interventions (PT)   Interventions Therapeutic Procedure/Exercise;Manual Therapy   Therapeutic Procedure/Exercise   Therapeutic Procedures: strength, endurance, ROM, flexibility minutes (16778) 25   Ther Proc 1 See note for exercise and Medx charts   Skilled Intervention MedX; RT; core strengthening - verbal and tactile cues for good  form   Patient Response/Progress Pt tolerated well - no increase in pain   Manual Therapy   Manual Therapy 1 prone- PA mobilizations grade II and III for mobility and pain L1-5, MFR lumbar paraspinals and QL bilat   Skilled Intervention dec pain   Education   Learner/Method Patient   Education Comments HEP, PT POC, Education on Medx program   Plan   Home program PTRX   Plan for next session Medx DE, rotary torso, trial reformer core exercises/progress core strengthening; review and progress HEP   Comments   Comments Patient seen for 12th follow up visit regarding low back pain. Patient with improvements in his pain since starting PT. Patient tested on lumbar medics today and shows good improvements in his overall maximal strength as well as flexion to extension ratio from initial eval. The patient is showing improvements in his hamstring mobility as well, is still limited bilaterally. Patient has made good progress towards his goals and his reporting independence with self management. At this point patient is appropriate to continue physical therapy exercises independently and we'll plan to discharge from PT.   Total Session Time   Timed Code Treatment Minutes 25   Total Treatment Time (sum of timed and untimed services) 25

## 2024-10-05 ENCOUNTER — HEALTH MAINTENANCE LETTER (OUTPATIENT)
Age: 49
End: 2024-10-05

## 2024-10-09 ENCOUNTER — TRANSFERRED RECORDS (OUTPATIENT)
Dept: HEALTH INFORMATION MANAGEMENT | Facility: CLINIC | Age: 49
End: 2024-10-09
Payer: COMMERCIAL